# Patient Record
Sex: MALE | Race: WHITE | NOT HISPANIC OR LATINO | Employment: OTHER | URBAN - METROPOLITAN AREA
[De-identification: names, ages, dates, MRNs, and addresses within clinical notes are randomized per-mention and may not be internally consistent; named-entity substitution may affect disease eponyms.]

---

## 2017-07-24 RX ORDER — LEVETIRACETAM 500 MG/1
TABLET ORAL
Qty: 180 TABLET | Refills: 0 | Status: SHIPPED | OUTPATIENT
Start: 2017-07-24 | End: 2017-11-03 | Stop reason: SDUPTHER

## 2017-09-05 DIAGNOSIS — G40.909 SEIZURE DISORDER (HCC): ICD-10-CM

## 2017-09-05 DIAGNOSIS — D75.89 MACROCYTOSIS WITHOUT ANEMIA: ICD-10-CM

## 2017-09-05 DIAGNOSIS — R79.89 ELEVATED TSH: ICD-10-CM

## 2017-09-05 DIAGNOSIS — G47.33 OSA (OBSTRUCTIVE SLEEP APNEA): ICD-10-CM

## 2017-09-05 DIAGNOSIS — Z00.00 HEALTH CARE MAINTENANCE: Primary | ICD-10-CM

## 2017-09-05 DIAGNOSIS — K52.839 MICROSCOPIC COLITIS, UNSPECIFIED MICROSCOPIC COLITIS TYPE: ICD-10-CM

## 2017-09-05 DIAGNOSIS — Z12.5 SCREENING FOR PROSTATE CANCER: ICD-10-CM

## 2017-09-07 LAB
ALBUMIN SERPL-MCNC: 4.3 G/DL (ref 3.5–5.2)
ALBUMIN/GLOB SERPL: 1.7 G/DL
ALP SERPL-CCNC: 61 U/L (ref 39–117)
ALT SERPL-CCNC: 8 U/L (ref 1–41)
APPEARANCE UR: CLEAR
AST SERPL-CCNC: 18 U/L (ref 1–40)
BACTERIA #/AREA URNS HPF: NORMAL /HPF
BASOPHILS # BLD AUTO: 0.02 10*3/MM3 (ref 0–0.2)
BASOPHILS NFR BLD AUTO: 0.4 % (ref 0–1.5)
BILIRUB SERPL-MCNC: 0.6 MG/DL (ref 0.1–1.2)
BILIRUB UR QL STRIP: NEGATIVE
BUN SERPL-MCNC: 12 MG/DL (ref 8–23)
BUN/CREAT SERPL: 12.8 (ref 7–25)
CALCIUM SERPL-MCNC: 9.8 MG/DL (ref 8.6–10.5)
CHLORIDE SERPL-SCNC: 95 MMOL/L (ref 98–107)
CO2 SERPL-SCNC: 28.3 MMOL/L (ref 22–29)
COLOR UR: YELLOW
CREAT SERPL-MCNC: 0.94 MG/DL (ref 0.76–1.27)
EOSINOPHIL # BLD AUTO: 0.1 10*3/MM3 (ref 0–0.7)
EOSINOPHIL NFR BLD AUTO: 2.2 % (ref 0.3–6.2)
EPI CELLS #/AREA URNS HPF: NORMAL /HPF
ERYTHROCYTE [DISTWIDTH] IN BLOOD BY AUTOMATED COUNT: 12.5 % (ref 11.5–14.5)
GLOBULIN SER CALC-MCNC: 2.5 GM/DL
GLUCOSE SERPL-MCNC: 83 MG/DL (ref 65–99)
GLUCOSE UR QL: NEGATIVE
HCT VFR BLD AUTO: 39.5 % (ref 40.4–52.2)
HGB BLD-MCNC: 13.9 G/DL (ref 13.7–17.6)
HGB UR QL STRIP: NEGATIVE
IMM GRANULOCYTES # BLD: 0 10*3/MM3 (ref 0–0.03)
IMM GRANULOCYTES NFR BLD: 0 % (ref 0–0.5)
KETONES UR QL STRIP: NEGATIVE
LEUKOCYTE ESTERASE UR QL STRIP: NEGATIVE
LYMPHOCYTES # BLD AUTO: 1.55 10*3/MM3 (ref 0.9–4.8)
LYMPHOCYTES NFR BLD AUTO: 34.8 % (ref 19.6–45.3)
MCH RBC QN AUTO: 35 PG (ref 27–32.7)
MCHC RBC AUTO-ENTMCNC: 35.2 G/DL (ref 32.6–36.4)
MCV RBC AUTO: 99.5 FL (ref 79.8–96.2)
MICRO URNS: NORMAL
MICRO URNS: NORMAL
MONOCYTES # BLD AUTO: 0.55 10*3/MM3 (ref 0.2–1.2)
MONOCYTES NFR BLD AUTO: 12.4 % (ref 5–12)
NEUTROPHILS # BLD AUTO: 2.23 10*3/MM3 (ref 1.9–8.1)
NEUTROPHILS NFR BLD AUTO: 50.2 % (ref 42.7–76)
NITRITE UR QL STRIP: NEGATIVE
PH UR STRIP: 7 [PH] (ref 5–7.5)
PLATELET # BLD AUTO: 177 10*3/MM3 (ref 140–500)
POTASSIUM SERPL-SCNC: 4.4 MMOL/L (ref 3.5–5.2)
PROT SERPL-MCNC: 6.8 G/DL (ref 6–8.5)
PROT UR QL STRIP: NEGATIVE
PSA SERPL-MCNC: 6.35 NG/ML (ref 0–4)
RBC # BLD AUTO: 3.97 10*6/MM3 (ref 4.6–6)
RBC #/AREA URNS HPF: NORMAL /HPF
SODIUM SERPL-SCNC: 134 MMOL/L (ref 136–145)
SP GR UR: 1.01 (ref 1–1.03)
TSH SERPL DL<=0.005 MIU/L-ACNC: 3.51 MIU/ML (ref 0.27–4.2)
URINALYSIS REFLEX: NORMAL
UROBILINOGEN UR STRIP-MCNC: 0.2 MG/DL (ref 0.2–1)
WBC # BLD AUTO: 4.45 10*3/MM3 (ref 4.5–10.7)
WBC #/AREA URNS HPF: NORMAL /HPF

## 2017-09-13 ENCOUNTER — OFFICE VISIT (OUTPATIENT)
Dept: INTERNAL MEDICINE | Facility: CLINIC | Age: 67
End: 2017-09-13

## 2017-09-13 VITALS
HEIGHT: 76 IN | SYSTOLIC BLOOD PRESSURE: 110 MMHG | OXYGEN SATURATION: 98 % | HEART RATE: 73 BPM | BODY MASS INDEX: 25.33 KG/M2 | RESPIRATION RATE: 18 BRPM | DIASTOLIC BLOOD PRESSURE: 68 MMHG | WEIGHT: 208 LBS

## 2017-09-13 DIAGNOSIS — D75.89 MACROCYTOSIS WITHOUT ANEMIA: ICD-10-CM

## 2017-09-13 DIAGNOSIS — Z00.00 HEALTH MAINTENANCE EXAMINATION: Primary | ICD-10-CM

## 2017-09-13 DIAGNOSIS — E87.1 HYPONATREMIA: ICD-10-CM

## 2017-09-13 DIAGNOSIS — Z23 NEED FOR PNEUMOCOCCAL VACCINATION: ICD-10-CM

## 2017-09-13 DIAGNOSIS — Z00.00 MEDICARE ANNUAL WELLNESS VISIT, INITIAL: ICD-10-CM

## 2017-09-13 PROCEDURE — 99397 PER PM REEVAL EST PAT 65+ YR: CPT | Performed by: INTERNAL MEDICINE

## 2017-09-13 PROCEDURE — 90732 PPSV23 VACC 2 YRS+ SUBQ/IM: CPT | Performed by: INTERNAL MEDICINE

## 2017-09-13 PROCEDURE — G0009 ADMIN PNEUMOCOCCAL VACCINE: HCPCS | Performed by: INTERNAL MEDICINE

## 2017-09-13 PROCEDURE — G0438 PPPS, INITIAL VISIT: HCPCS | Performed by: INTERNAL MEDICINE

## 2017-09-13 RX ORDER — ASPIRIN 81 MG/1
81 TABLET ORAL DAILY
COMMUNITY
End: 2019-10-28

## 2017-09-13 NOTE — PROGRESS NOTES
Medicare Annual Wellness Visit    Chief Complaint:  Annual Exam (Initial AWV & CPE)      History of Present Illness    Kadeem Reyes is a 67 y.o. male who presents for an Annual Wellness Visit & CPE. In addition, we addressed the following health issues:    C-scope: 2012 Dr. Sweet   Flu shot: not yet  Pneumovax: due  Dental Exam: within the past year  Eye Exam: within the past year  Exercise:  Goes to [facility]  Advanced Care Planning:  has NO advance directive - information provided to the patient today   Immunization History   Administered Date(s) Administered   • Influenza, Quadrivalent 09/29/2013, 08/31/2015   • Pneumococcal Conjugate 13-Valent 09/08/2016   • Pneumococcal Polysaccharide 09/13/2017   • Tdap 01/01/2011   • influenza Split 09/09/2016     He just saw urology for his prostate exam and had labs there.  (Dr. Lincoln Rinaldi).  He was told everything looks fine.      It has been a couple of years since he has seen Dr. Richmond for his colitis.  No blood in his stools.  The budesonide keeps everything controlled.  We discussed that his hgb decreased one point.  He has not noticed any blood in his stools.  He does bruise and bleed easily so he has cut his ASA back to three days a week.      Review of Systems   Constitution: Negative for chills, fever and malaise/fatigue.   HENT: Negative for headaches, hearing loss, hoarse voice and sore throat.    Eyes: Negative for blurred vision, double vision and visual disturbance.   Cardiovascular: Negative for chest pain, leg swelling and palpitations.   Respiratory: Negative for cough and shortness of breath.    Endocrine: Negative for polydipsia and polyuria.   Hematologic/Lymphatic: Bruises/bleeds easily.   Skin: Negative for rash and suspicious lesions.   Musculoskeletal: Negative for arthritis and myalgias.   Gastrointestinal: Negative for abdominal pain, change in bowel habit, constipation, diarrhea, dysphagia, hematochezia, melena, nausea and vomiting.    Genitourinary: Negative for dysuria and hematuria.   Neurological: Negative for dizziness and light-headedness.   Psychiatric/Behavioral: Negative for depression. The patient is not nervous/anxious.        Past Medical History:   Diagnosis Date   • Gait instability    • History of gout    • Hyponatremia    • Intracranial hemorrhage    • Osteoarthritis    • Peripheral neuropathy    • Seizure disorder    • Seizures    • Sleep apnea    • Traumatic brain injury        Past Surgical History:   Procedure Laterality Date   • ABDOMINAL SURGERY     • EYE SURGERY         Social History     Social History   • Marital status:      Spouse name: N/A   • Number of children: 2   • Years of education: N/A     Occupational History   • retired teacher      Social History Main Topics   • Smoking status: Never Smoker   • Smokeless tobacco: Never Used   • Alcohol use Yes      Comment: daily 3-4 beers   • Drug use: No   • Sexual activity: Not on file     Other Topics Concern   • Not on file     Social History Narrative       Family History   Problem Relation Age of Onset   • Kidney cancer Mother    • Diabetes Father    • Diabetes Sister    • Leukemia Sister        Allergies   Allergen Reactions   • Phenytoin        Current Outpatient Prescriptions on File Prior to Visit   Medication Sig Dispense Refill   • BUDESONIDE ER PO Take  by mouth daily.     • calcium carbonate-vitamin d (CALTRATE 600+D) 600-400 MG-UNIT per tablet Take  by mouth.     • Cholecalciferol (VITAMIN D) 1000 UNITS tablet Take  by mouth.     • fluocinonide (LIDEX) 0.05 % external solution APPLY TO SCALP D  8   • levETIRAcetam (KEPPRA) 500 MG tablet TAKE 1 TABLET BY MOUTH TWICE DAILY 180 tablet 0   • vitamin E 600 UNIT capsule Take  by mouth.     • [DISCONTINUED] silver sulfadiazine (SILVADENE, SSD) 1 % cream Apply  topically 2 (Two) Times a Day. 50 g 0     No current facility-administered medications on file prior to visit.        Patient Active Problem List  "  Diagnosis   • Gait instability   • Hyponatremia   • Microscopic colitis   • Disorder of peripheral nervous system   • Seizure disorder   • DANIEL (obstructive sleep apnea)   • Elevated TSH   • Macrocytosis without anemia   • Rosacea       Health Risk Assessment/Depression Screen/Functional and Cognitive Screening:   The patient has completed a Health Risk Assessment & Depression screen. These have been reviewed with them and have been scanned as a separate documents.    Age-appropriate Screening Schedule:  Refer to the list below for future screening recommendations based on patient's age. Orders for these recommended tests are listed in the plan section. The patient has been provided with a written plan.     I have reviewed their problem list, past medical history, family history, social history, and surgical history.     Vitals:    09/13/17 0937   BP: 110/68   Pulse: 73   Resp: 18   SpO2: 98%   Weight: 208 lb (94.3 kg)   Height: 76\" (193 cm)   PainSc: 0-No pain       Body mass index is 25.32 kg/(m^2).    Physical Exam   Constitutional: He is oriented to person, place, and time. He appears well-developed and well-nourished. No distress.   HENT:   Head: Normocephalic and atraumatic.   Right Ear: Hearing and external ear normal.   Left Ear: Hearing and external ear normal.   Nose: Nose normal.   Mouth/Throat: Oropharynx is clear and moist and mucous membranes are normal.   Eyes: Conjunctivae, EOM and lids are normal. Pupils are equal, round, and reactive to light. No scleral icterus.   Neck: Trachea normal and normal range of motion. Neck supple. Carotid bruit is not present.   Cardiovascular: Normal rate, regular rhythm and normal heart sounds.  Exam reveals no gallop and no friction rub.    No murmur heard.  Pulses:       Carotid pulses are 2+ on the right side, and 2+ on the left side.       Femoral pulses are 2+ on the right side, and 2+ on the left side.       Dorsalis pedis pulses are 2+ on the right side, and " 2+ on the left side.        Posterior tibial pulses are 2+ on the right side, and 2+ on the left side.   Pulmonary/Chest: Effort normal and breath sounds normal. No respiratory distress. He has no wheezes. He has no rales.   Abdominal: Soft. Normal appearance and bowel sounds are normal. He exhibits no distension and no mass. There is no tenderness.   Musculoskeletal: Normal range of motion. He exhibits no edema.       Vascular Status -  His exam exhibits no right foot edema. His exam exhibits no left foot edema.   Skin Integrity  -  His right foot skin is intact.     Kadeem 's left foot skin is intact. .  Lymphadenopathy:     He has no cervical adenopathy.        Right: No inguinal and no supraclavicular adenopathy present.        Left: No inguinal and no supraclavicular adenopathy present.   Neurological: He is alert and oriented to person, place, and time. He has normal strength. No cranial nerve deficit. He exhibits normal muscle tone. Coordination and gait normal.   Skin: Skin is warm and dry. No rash noted.   Psychiatric: He has a normal mood and affect. His speech is normal and behavior is normal. Judgment and thought content normal. Cognition and memory are normal.   Vitals reviewed.      Results for orders placed or performed in visit on 09/05/17   Comprehensive Metabolic Panel   Result Value Ref Range    Glucose 83 65 - 99 mg/dL    BUN 12 8 - 23 mg/dL    Creatinine 0.94 0.76 - 1.27 mg/dL    eGFR Non African Am 81 >60 mL/min/1.73    eGFR African Am 98 >60 mL/min/1.73    BUN/Creatinine Ratio 12.8 7.0 - 25.0    Sodium 134 (L) 136 - 145 mmol/L    Potassium 4.4 3.5 - 5.2 mmol/L    Chloride 95 (L) 98 - 107 mmol/L    Total CO2 28.3 22.0 - 29.0 mmol/L    Calcium 9.8 8.6 - 10.5 mg/dL    Total Protein 6.8 6.0 - 8.5 g/dL    Albumin 4.30 3.50 - 5.20 g/dL    Globulin 2.5 gm/dL    A/G Ratio 1.7 g/dL    Total Bilirubin 0.6 0.1 - 1.2 mg/dL    Alkaline Phosphatase 61 39 - 117 U/L    AST (SGOT) 18 1 - 40 U/L    ALT (SGPT) 8 1  - 41 U/L   TSH Rfx On Abnormal To Free T4   Result Value Ref Range    TSH 3.51 0.27 - 4.2 mIU/mL   UA / M With / Rflx Culture([facility] ONLY)   Result Value Ref Range    Specific Gravity, UA 1.008 1.005 - 1.030    pH, UA 7.0 5.0 - 7.5    Color, UA Yellow Yellow    Appearance, UA Clear Clear    Leukocytes, UA Negative Negative    Protein Negative Negative/Trace    Glucose, UA Negative Negative    Ketones Negative Negative    Blood, UA Negative Negative    Bilirubin, UA Negative Negative    Urobilinogen, UA 0.2 0.2 - 1.0 mg/dL    Nitrite, UA Negative Negative    Microscopic Examination Comment     MICROSCOPIC EXAMINATION See below:     Urinalysis Reflex Comment    PSA   Result Value Ref Range    PSA 6.350 (H) 0.000 - 4.000 ng/mL   Microscopic Examination   Result Value Ref Range    WBC, UA None seen 0 - 5 /hpf    RBC, UA None seen 0 - 2 /hpf    Epithelial Cells (non renal) None seen 0 - 10 /hpf    Bacteria, UA None seen None seen/Few /hpf   CBC & Differential   Result Value Ref Range    WBC 4.45 (L) 4.50 - 10.70 10*3/mm3    RBC 3.97 (L) 4.60 - 6.00 10*6/mm3    Hemoglobin 13.9 13.7 - 17.6 g/dL    Hematocrit 39.5 (L) 40.4 - 52.2 %    MCV 99.5 (H) 79.8 - 96.2 fL    MCH 35.0 (H) 27.0 - 32.7 pg    MCHC 35.2 32.6 - 36.4 g/dL    RDW 12.5 11.5 - 14.5 %    Platelets 177 140 - 500 10*3/mm3    Neutrophil Rel % 50.2 42.7 - 76.0 %    Lymphocyte Rel % 34.8 19.6 - 45.3 %    Monocyte Rel % 12.4 (H) 5.0 - 12.0 %    Eosinophil Rel % 2.2 0.3 - 6.2 %    Basophil Rel % 0.4 0.0 - 1.5 %    Neutrophils Absolute 2.23 1.90 - 8.10 10*3/mm3    Lymphocytes Absolute 1.55 0.90 - 4.80 10*3/mm3    Monocytes Absolute 0.55 0.20 - 1.20 10*3/mm3    Eosinophils Absolute 0.10 0.00 - 0.70 10*3/mm3    Basophils Absolute 0.02 0.00 - 0.20 10*3/mm3    Immature Granulocyte Rel % 0.0 0.0 - 0.5 %    Immature Grans Absolute 0.00 0.00 - 0.03 10*3/mm3       Assessment & Plan:    Diagnoses and all orders for this visit:    Health maintenance examination    Medicare  annual wellness visit, initial    Need for pneumococcal vaccination  -     Pneumococcal Polysaccharide Vaccine 23-Valent Greater Than or Equal To 1yo Subcutaneous / IM    Hyponatremia    Macrocytosis without anemia  -     CBC & Differential; Future    Other orders  -     aspirin 81 MG EC tablet; Take 81 mg by mouth Daily.        Discussion/Summary  Kadeem is here for routine CPE and initial AWV.  He is doing well.  Will get pneumovax today.  Encouraged to look into the shingles vaccine at [facility].  He will get a flu shot at an outside facility.  Continue regular exercise.  We will repeat a CBC in one month to make sure his hgb is not continuing to decrease.   He has a chronic mild hyponatremia that is very stable.  Will continue to monitor yearly.      Follow Up:  Return in about 1 month (around 10/13/2017) for labs only; 1 year CPE with labs prior.     An After Visit Summary and PPPS with all of these plans were given to the patient.

## 2017-09-13 NOTE — PATIENT INSTRUCTIONS
Medicare Wellness  Personal Prevention Plan of Service     Date of Office Visit:  2017  Encounter Provider:  Jacquelyn Crowe MD  Place of Service:  [facility] INTERNAL MEDICINE  Patient Name: Kadeem Reyes  :  1950    As part of the Medicare Wellness portion of your visit today, we are providing you with this personalized preventive plan of services (PPPS). This plan is based upon recommendations of the [place] Preventive Services Task Force (USPSTF) and the Advisory Committee on Immunization Practices (ACIP).    This lists the preventive care services that should be considered, and provides dates of when you are due. Items listed as completed are up-to-date and do not require any further intervention.    Health Maintenance   Topic Date Due   • MEDICARE ANNUAL WELLNESS  2016   • ZOSTER VACCINE  2016   • INFLUENZA VACCINE  2017   • PNEUMOCOCCAL VACCINES (65+ LOW/MEDIUM RISK) (2 of 2 - PPSV23) 2017   • TDAP/TD VACCINES (2 - Td) 2021   • COLONOSCOPY  2022   • HEPATITIS C SCREENING  Completed       Orders Placed This Encounter   Procedures   • Pneumococcal Polysaccharide Vaccine 23-Valent Greater Than or Equal To 3yo Subcutaneous / IM       Return in about 1 month (around 10/13/2017) for labs only; 1 year CPE with labs prior.

## 2017-10-13 DIAGNOSIS — D75.89 MACROCYTOSIS WITHOUT ANEMIA: ICD-10-CM

## 2017-10-13 LAB
BASOPHILS # BLD AUTO: 0.01 10*3/MM3 (ref 0–0.2)
BASOPHILS NFR BLD AUTO: 0.2 % (ref 0–1.5)
EOSINOPHIL # BLD AUTO: 0.07 10*3/MM3 (ref 0–0.7)
EOSINOPHIL NFR BLD AUTO: 1.7 % (ref 0.3–6.2)
ERYTHROCYTE [DISTWIDTH] IN BLOOD BY AUTOMATED COUNT: 13 % (ref 11.5–14.5)
HCT VFR BLD AUTO: 41 % (ref 40.4–52.2)
HGB BLD-MCNC: 14.3 G/DL (ref 13.7–17.6)
IMM GRANULOCYTES # BLD: 0 10*3/MM3 (ref 0–0.03)
IMM GRANULOCYTES NFR BLD: 0 % (ref 0–0.5)
LYMPHOCYTES # BLD AUTO: 1.57 10*3/MM3 (ref 0.9–4.8)
LYMPHOCYTES NFR BLD AUTO: 37.3 % (ref 19.6–45.3)
MCH RBC QN AUTO: 35 PG (ref 27–32.7)
MCHC RBC AUTO-ENTMCNC: 34.9 G/DL (ref 32.6–36.4)
MCV RBC AUTO: 100.2 FL (ref 79.8–96.2)
MONOCYTES # BLD AUTO: 0.54 10*3/MM3 (ref 0.2–1.2)
MONOCYTES NFR BLD AUTO: 12.8 % (ref 5–12)
NEUTROPHILS # BLD AUTO: 2.02 10*3/MM3 (ref 1.9–8.1)
NEUTROPHILS NFR BLD AUTO: 48 % (ref 42.7–76)
PLATELET # BLD AUTO: 150 10*3/MM3 (ref 140–500)
RBC # BLD AUTO: 4.09 10*6/MM3 (ref 4.6–6)
WBC # BLD AUTO: 4.21 10*3/MM3 (ref 4.5–10.7)

## 2017-10-16 ENCOUNTER — TELEPHONE (OUTPATIENT)
Dept: INTERNAL MEDICINE | Facility: CLINIC | Age: 67
End: 2017-10-16

## 2017-10-16 NOTE — TELEPHONE ENCOUNTER
----- Message from Jacquelyn Crowe MD sent at 10/16/2017  8:10 AM EDT -----  Please call - his hgb has gone back up to where it normally has been.

## 2017-10-24 ENCOUNTER — OFFICE VISIT (OUTPATIENT)
Dept: GASTROENTEROLOGY | Facility: CLINIC | Age: 67
End: 2017-10-24

## 2017-10-24 VITALS
TEMPERATURE: 98.1 F | HEIGHT: 76 IN | BODY MASS INDEX: 24.87 KG/M2 | DIASTOLIC BLOOD PRESSURE: 68 MMHG | SYSTOLIC BLOOD PRESSURE: 122 MMHG | WEIGHT: 204.2 LBS

## 2017-10-24 DIAGNOSIS — K52.9 COLITIS: Primary | ICD-10-CM

## 2017-10-24 PROCEDURE — 99214 OFFICE O/P EST MOD 30 MIN: CPT | Performed by: INTERNAL MEDICINE

## 2017-10-24 RX ORDER — BUDESONIDE 3 MG/1
3 CAPSULE, COATED PELLETS ORAL DAILY PRN
Qty: 30 CAPSULE | Refills: 5 | Status: SHIPPED | OUTPATIENT
Start: 2017-10-24 | End: 2018-08-27 | Stop reason: SDUPTHER

## 2017-10-24 NOTE — PROGRESS NOTES
Chief Complaint   Patient presents with   • Follow-up     microscopic colitis        Kadeem Reyes is a  67 y.o. male here for a follow up visit for Microscopic colitis    HPI This 67-year-old white male patient of Dr. Jacquelyn Crowe returns in follow-up since his last visit April 2015.  He has a history of microscopic colitis and has been treated in the past with budesonide that affords good symptomatic relief.  He currently has intermittent symptoms requiring him to take 3 mg dose every 2 weeks or so.  I advised he could continue this regimen for the present.  We did talk about possible IBS issues, he is going to try a fiber supplement to see if this affords relief of his loose stool pattern.  He denies any significant stress or pain associated with his bowel movements.  His weight has been stable.  Last colonoscopy was performed in March 2012, he has no family history of colon cancer or polyps.  He would be due for follow-up examination in 2022.  I've advised to follow-up annually and call sooner as needed.    Past Medical History:   Diagnosis Date   • Gait instability    • History of gout    • Hyponatremia    • Intracranial hemorrhage    • Osteoarthritis    • Peripheral neuropathy    • Seizure disorder    • Seizures    • Sleep apnea    • Traumatic brain injury        Current Outpatient Prescriptions   Medication Sig Dispense Refill   • aspirin 81 MG EC tablet Take 81 mg by mouth Daily.     • calcium carbonate-vitamin d (CALTRATE 600+D) 600-400 MG-UNIT per tablet Take  by mouth.     • Cholecalciferol (VITAMIN D) 1000 UNITS tablet Take  by mouth.     • fluocinonide (LIDEX) 0.05 % external solution APPLY TO SCALP D  8   • levETIRAcetam (KEPPRA) 500 MG tablet TAKE 1 TABLET BY MOUTH TWICE DAILY 180 tablet 0   • vitamin E 600 UNIT capsule Take  by mouth.     • BUDESONIDE ER PO Take  by mouth daily.       No current facility-administered medications for this visit.        PRN Meds:.    Allergies   Allergen Reactions   •  Phenytoin        Social History     Social History   • Marital status:      Spouse name: N/A   • Number of children: 2   • Years of education: N/A     Occupational History   • retired teacher      Social History Main Topics   • Smoking status: Never Smoker   • Smokeless tobacco: Never Used   • Alcohol use Yes      Comment: daily 3-4 beers   • Drug use: No   • Sexual activity: Not on file     Other Topics Concern   • Not on file     Social History Narrative       Family History   Problem Relation Age of Onset   • Kidney cancer Mother    • Diabetes Father    • Diabetes Sister    • Leukemia Sister        Review of Systems   Constitutional: Negative for activity change, appetite change, fatigue and unexpected weight change.   HENT: Negative for congestion, facial swelling, sore throat, trouble swallowing and voice change.    Eyes: Negative for photophobia and visual disturbance.   Respiratory: Negative for cough and choking.    Cardiovascular: Negative for chest pain.   Gastrointestinal: Positive for diarrhea. Negative for abdominal distention, abdominal pain, anal bleeding, blood in stool, constipation, nausea, rectal pain and vomiting.   Endocrine: Negative for polyphagia.   Musculoskeletal: Negative for arthralgias, gait problem and joint swelling.   Skin: Negative for color change, pallor and rash.   Allergic/Immunologic: Negative for food allergies.   Neurological: Negative for speech difficulty and headaches.   Hematological: Does not bruise/bleed easily.   Psychiatric/Behavioral: Negative for agitation, confusion and sleep disturbance.       Vitals:    10/24/17 0842   BP: 122/68   Temp: 98.1 °F (36.7 °C)       Physical Exam   Constitutional: He is oriented to person, place, and time. He appears well-developed and well-nourished.   HENT:   Head: Normocephalic.   Mouth/Throat: Oropharynx is clear and moist.   Eyes: Conjunctivae and EOM are normal.   Neck: Normal range of motion.   Cardiovascular: Normal rate  and regular rhythm.    Pulmonary/Chest: Breath sounds normal.   Abdominal: Soft. Bowel sounds are normal.   Musculoskeletal: Normal range of motion.   Neurological: He is alert and oriented to person, place, and time.   Skin: Skin is warm and dry.   Psychiatric: He has a normal mood and affect. His behavior is normal.       ASSESSMENT   #1 microscopic colitis: Relatively stable, uses budesonide on occasion.      PLAN  Prescription for budesonide  Patient to try a fiber supplement  Follow-up in 1 year, call sooner as needed      ICD-10-CM ICD-9-CM   1. Colitis K52.9 558.9

## 2017-11-03 RX ORDER — LEVETIRACETAM 500 MG/1
TABLET ORAL
Qty: 180 TABLET | Refills: 0 | Status: SHIPPED | OUTPATIENT
Start: 2017-11-03 | End: 2018-06-18

## 2017-11-03 NOTE — TELEPHONE ENCOUNTER
Pt is out of his Kepra, Sebastien agreed to refill for pt, since his doctor had left the practice. Script sent in today.

## 2018-05-15 ENCOUNTER — OFFICE VISIT (OUTPATIENT)
Dept: INTERNAL MEDICINE | Facility: CLINIC | Age: 68
End: 2018-05-15

## 2018-05-15 VITALS
HEIGHT: 76 IN | OXYGEN SATURATION: 98 % | BODY MASS INDEX: 26.3 KG/M2 | DIASTOLIC BLOOD PRESSURE: 74 MMHG | WEIGHT: 216 LBS | RESPIRATION RATE: 18 BRPM | SYSTOLIC BLOOD PRESSURE: 130 MMHG | HEART RATE: 73 BPM

## 2018-05-15 DIAGNOSIS — R79.89 ELEVATED TSH: ICD-10-CM

## 2018-05-15 DIAGNOSIS — M25.472 ANKLE EDEMA, BILATERAL: Primary | ICD-10-CM

## 2018-05-15 DIAGNOSIS — M25.471 ANKLE EDEMA, BILATERAL: Primary | ICD-10-CM

## 2018-05-15 DIAGNOSIS — E87.1 HYPONATREMIA: ICD-10-CM

## 2018-05-15 LAB
ALBUMIN SERPL-MCNC: 4.4 G/DL (ref 3.5–5.2)
ALBUMIN/GLOB SERPL: 1.8 G/DL
ALP SERPL-CCNC: 65 U/L (ref 39–117)
ALT SERPL-CCNC: 12 U/L (ref 1–41)
AST SERPL-CCNC: 17 U/L (ref 1–40)
BASOPHILS # BLD AUTO: 0.01 10*3/MM3 (ref 0–0.2)
BASOPHILS NFR BLD AUTO: 0.2 % (ref 0–1.5)
BILIRUB SERPL-MCNC: 0.8 MG/DL (ref 0.1–1.2)
BUN SERPL-MCNC: 11 MG/DL (ref 8–23)
BUN/CREAT SERPL: 12.4 (ref 7–25)
CALCIUM SERPL-MCNC: 9.2 MG/DL (ref 8.6–10.5)
CHLORIDE SERPL-SCNC: 90 MMOL/L (ref 98–107)
CO2 SERPL-SCNC: 26.1 MMOL/L (ref 22–29)
CREAT SERPL-MCNC: 0.89 MG/DL (ref 0.76–1.27)
EOSINOPHIL # BLD AUTO: 0.07 10*3/MM3 (ref 0–0.7)
EOSINOPHIL NFR BLD AUTO: 1.7 % (ref 0.3–6.2)
ERYTHROCYTE [DISTWIDTH] IN BLOOD BY AUTOMATED COUNT: 12.3 % (ref 11.5–14.5)
GFR SERPLBLD CREATININE-BSD FMLA CKD-EPI: 104 ML/MIN/1.73
GFR SERPLBLD CREATININE-BSD FMLA CKD-EPI: 86 ML/MIN/1.73
GLOBULIN SER CALC-MCNC: 2.4 GM/DL
GLUCOSE SERPL-MCNC: 90 MG/DL (ref 65–99)
HCT VFR BLD AUTO: 39 % (ref 40.4–52.2)
HGB BLD-MCNC: 14.2 G/DL (ref 13.7–17.6)
IMM GRANULOCYTES # BLD: 0 10*3/MM3 (ref 0–0.03)
IMM GRANULOCYTES NFR BLD: 0 % (ref 0–0.5)
LYMPHOCYTES # BLD AUTO: 0.96 10*3/MM3 (ref 0.9–4.8)
LYMPHOCYTES NFR BLD AUTO: 23.4 % (ref 19.6–45.3)
MCH RBC QN AUTO: 35.6 PG (ref 27–32.7)
MCHC RBC AUTO-ENTMCNC: 36.4 G/DL (ref 32.6–36.4)
MCV RBC AUTO: 97.7 FL (ref 79.8–96.2)
MONOCYTES # BLD AUTO: 0.6 10*3/MM3 (ref 0.2–1.2)
MONOCYTES NFR BLD AUTO: 14.6 % (ref 5–12)
NEUTROPHILS # BLD AUTO: 2.47 10*3/MM3 (ref 1.9–8.1)
NEUTROPHILS NFR BLD AUTO: 60.1 % (ref 42.7–76)
PLATELET # BLD AUTO: 176 10*3/MM3 (ref 140–500)
POTASSIUM SERPL-SCNC: 5 MMOL/L (ref 3.5–5.2)
PROT SERPL-MCNC: 6.8 G/DL (ref 6–8.5)
RBC # BLD AUTO: 3.99 10*6/MM3 (ref 4.6–6)
SODIUM SERPL-SCNC: 128 MMOL/L (ref 136–145)
TSH SERPL DL<=0.005 MIU/L-ACNC: 3.31 MIU/ML (ref 0.27–4.2)
WBC # BLD AUTO: 4.11 10*3/MM3 (ref 4.5–10.7)

## 2018-05-15 PROCEDURE — 99214 OFFICE O/P EST MOD 30 MIN: CPT | Performed by: INTERNAL MEDICINE

## 2018-05-15 PROCEDURE — 93000 ELECTROCARDIOGRAM COMPLETE: CPT | Performed by: INTERNAL MEDICINE

## 2018-05-15 NOTE — PROGRESS NOTES
Chief Complaint  Kadeem Reyes is a 67 y.o. male who presents for Joint Swelling (Bilateral ankle swelling, left swells more. x5days, (fine in the morning, happens in the evening) walking is difficult, denies redness and pain. Stiff feeling. )  .    History of Present Illness   Kadeem is here for acute care for a new issue.  He has had bilateral ankle swelling for 5 days.  No pain in his ankles, just stiffness.  No erythema.  No cp or palp or dizziness. He hasn't travelled anywhere lately.  No long car or plane rides.  No new meds.  He feels fine otherwise.  He has been using a fair amount of sea salt on his tomatoes.  That's the only new thing he has been doing.      Review of Systems   Constitution: Positive for weight gain.   Cardiovascular: Positive for leg swelling. Negative for chest pain, claudication, dyspnea on exertion and palpitations.   Respiratory: Negative for cough and shortness of breath.    Skin: Negative for color change and rash.   Musculoskeletal: Positive for stiffness. Negative for gout and muscle cramps.       Patient Active Problem List   Diagnosis   • Gait instability   • Hyponatremia   • Microscopic colitis   • Disorder of peripheral nervous system   • Seizure disorder   • DANIEL (obstructive sleep apnea)   • Elevated TSH   • Macrocytosis without anemia   • Rosacea       Past Medical History:   Diagnosis Date   • Gait instability    • History of gout    • Hyponatremia    • Intracranial hemorrhage    • Osteoarthritis    • Peripheral neuropathy    • Seizure disorder    • Seizures    • Sleep apnea    • Traumatic brain injury        Past Surgical History:   Procedure Laterality Date   • ABDOMINAL SURGERY     • COLONOSCOPY  03/27/2012    normal   • EYE SURGERY         Family History   Problem Relation Age of Onset   • Kidney cancer Mother    • Diabetes Father    • Diabetes Sister    • Leukemia Sister        Social History     Social History   • Marital status:      Spouse name: N/A   • Number of  "children: 2   • Years of education: N/A     Occupational History   • retired teacher      Social History Main Topics   • Smoking status: Never Smoker   • Smokeless tobacco: Never Used   • Alcohol use Yes      Comment: daily 3-4 beers   • Drug use: No   • Sexual activity: Not on file     Other Topics Concern   • Not on file     Social History Narrative   • No narrative on file       Current Outpatient Prescriptions on File Prior to Visit   Medication Sig Dispense Refill   • aspirin 81 MG EC tablet Take 81 mg by mouth Daily.     • Budesonide (ENTOCORT EC) 3 MG 24 hr capsule Take 1 capsule by mouth Daily As Needed (Loose stools). 30 capsule 5   • calcium carbonate-vitamin d (CALTRATE 600+D) 600-400 MG-UNIT per tablet Take  by mouth.     • Cholecalciferol (VITAMIN D) 1000 UNITS tablet Take  by mouth.     • fluocinonide (LIDEX) 0.05 % external solution APPLY TO SCALP D  8   • levETIRAcetam (KEPPRA) 500 MG tablet Take 1 tablet by mouth twice daily. 180 tablet 0   • vitamin E 600 UNIT capsule Take  by mouth.       No current facility-administered medications on file prior to visit.        Allergies   Allergen Reactions   • Phenytoin        Vitals:    05/15/18 0845   BP: 130/74   Pulse: 73   Resp: 18   SpO2: 98%   Weight: 98 kg (216 lb)   Height: 193 cm (75.98\")       Body mass index is 26.3 kg/m².    Objective   Physical Exam   Constitutional: He is oriented to person, place, and time. He appears well-developed and well-nourished. No distress.   HENT:   Head: Normocephalic and atraumatic.   Mouth/Throat: Oropharynx is clear and moist.   Eyes: Conjunctivae are normal. No scleral icterus.   Neck: Normal range of motion. Neck supple.   Cardiovascular: Normal rate, regular rhythm and normal heart sounds.    No murmur heard.  Pulmonary/Chest: Effort normal and breath sounds normal. No respiratory distress. He has no wheezes. He has no rales.   Musculoskeletal: He exhibits edema (2+ BLE ankle swelling).   No calf tenderness " bilaterally.  No erythema or warmth.  Neg Dorinda sign   Lymphadenopathy:     He has no cervical adenopathy.   Neurological: He is alert and oriented to person, place, and time. No cranial nerve deficit.   Psychiatric: He has a normal mood and affect. His behavior is normal. Judgment and thought content normal.       Results for orders placed or performed in visit on 10/13/17   CBC & Differential   Result Value Ref Range    WBC 4.21 (L) 4.50 - 10.70 10*3/mm3    RBC 4.09 (L) 4.60 - 6.00 10*6/mm3    Hemoglobin 14.3 13.7 - 17.6 g/dL    Hematocrit 41.0 40.4 - 52.2 %    .2 (H) 79.8 - 96.2 fL    MCH 35.0 (H) 27.0 - 32.7 pg    MCHC 34.9 32.6 - 36.4 g/dL    RDW 13.0 11.5 - 14.5 %    Platelets 150 140 - 500 10*3/mm3    Neutrophil Rel % 48.0 42.7 - 76.0 %    Lymphocyte Rel % 37.3 19.6 - 45.3 %    Monocyte Rel % 12.8 (H) 5.0 - 12.0 %    Eosinophil Rel % 1.7 0.3 - 6.2 %    Basophil Rel % 0.2 0.0 - 1.5 %    Neutrophils Absolute 2.02 1.90 - 8.10 10*3/mm3    Lymphocytes Absolute 1.57 0.90 - 4.80 10*3/mm3    Monocytes Absolute 0.54 0.20 - 1.20 10*3/mm3    Eosinophils Absolute 0.07 0.00 - 0.70 10*3/mm3    Basophils Absolute 0.01 0.00 - 0.20 10*3/mm3    Immature Granulocyte Rel % 0.0 0.0 - 0.5 %    Immature Grans Absolute 0.00 0.00 - 0.03 10*3/mm3       ECG 12 Lead  Date/Time: 5/15/2018 10:14 AM  Performed by: ERIN RODRIGUEZ  Authorized by: ERIN RODRIGUEZ   Comparison: not compared with previous ECG   Rhythm: sinus rhythm  Rate: normal  BPM: 63  Conduction: conduction normal  ST Segments: ST segments normal  QRS axis: normal  Other: no other findings  Clinical impression: normal ECG  Comments: Reason for exam:  ble edema              Assessment/Plan   Diagnoses and all orders for this visit:    Ankle edema, bilateral  -     Comprehensive Metabolic Panel  -     CBC & Differential  -     ECG 12 Lead    Hyponatremia  -     TSH  -     ECG 12 Lead    Elevated TSH  -     TSH  -     ECG 12 Lead        Discussion/Summary  Kadeem botello  here for acute care for a new issue.  He certainly has edema in his ankles which is new.  He has no risk factors for DVT.  He is not having any other cardiac symptoms.  We will check labs today.  He is going to stop salting his tomatoes.  Will determine tomorrow once his labs are back whether to get BLE dopplers and/or 2decho.    No Follow-up on file.

## 2018-05-16 DIAGNOSIS — E87.1 HYPONATREMIA: ICD-10-CM

## 2018-05-16 DIAGNOSIS — R60.0 BILATERAL LOWER EXTREMITY EDEMA: Primary | ICD-10-CM

## 2018-05-17 ENCOUNTER — HOSPITAL ENCOUNTER (OUTPATIENT)
Dept: CARDIOLOGY | Facility: HOSPITAL | Age: 68
Discharge: HOME | End: 2018-05-17
Admitting: INTERNAL MEDICINE

## 2018-05-17 ENCOUNTER — TELEPHONE (OUTPATIENT)
Dept: INTERNAL MEDICINE | Facility: CLINIC | Age: 68
End: 2018-05-17

## 2018-05-17 VITALS
SYSTOLIC BLOOD PRESSURE: 118 MMHG | HEART RATE: 78 BPM | HEIGHT: 76 IN | WEIGHT: 216 LBS | DIASTOLIC BLOOD PRESSURE: 66 MMHG | BODY MASS INDEX: 26.3 KG/M2

## 2018-05-17 DIAGNOSIS — E87.1 HYPONATREMIA: Primary | ICD-10-CM

## 2018-05-17 DIAGNOSIS — R60.0 BILATERAL LOWER EXTREMITY EDEMA: ICD-10-CM

## 2018-05-17 DIAGNOSIS — E87.1 HYPONATREMIA: ICD-10-CM

## 2018-05-17 LAB
BH CV ECHO MEAS - ACS: 2.4 CM
BH CV ECHO MEAS - AI DEC SLOPE: 79.6 CM/SEC^2
BH CV ECHO MEAS - AI MAX PG: 18 MMHG
BH CV ECHO MEAS - AI MAX VEL: 212.3 CM/SEC
BH CV ECHO MEAS - AI P1/2T: 781.5 MSEC
BH CV ECHO MEAS - AO MAX PG (FULL): 1.1 MMHG
BH CV ECHO MEAS - AO MAX PG: 3.6 MMHG
BH CV ECHO MEAS - AO MEAN PG (FULL): 0.86 MMHG
BH CV ECHO MEAS - AO MEAN PG: 2.4 MMHG
BH CV ECHO MEAS - AO ROOT AREA (BSA CORRECTED): 1.6
BH CV ECHO MEAS - AO ROOT AREA: 10.4 CM^2
BH CV ECHO MEAS - AO ROOT DIAM: 3.6 CM
BH CV ECHO MEAS - AO V2 MAX: 95 CM/SEC
BH CV ECHO MEAS - AO V2 MEAN: 73.1 CM/SEC
BH CV ECHO MEAS - AO V2 VTI: 26.8 CM
BH CV ECHO MEAS - AVA(I,A): 2.3 CM^2
BH CV ECHO MEAS - AVA(I,D): 2.3 CM^2
BH CV ECHO MEAS - AVA(V,A): 2.9 CM^2
BH CV ECHO MEAS - AVA(V,D): 2.9 CM^2
BH CV ECHO MEAS - BSA(HAYCOCK): 2.3 M^2
BH CV ECHO MEAS - BSA: 2.3 M^2
BH CV ECHO MEAS - BZI_BMI: 25.6 KILOGRAMS/M^2
BH CV ECHO MEAS - BZI_METRIC_HEIGHT: 193 CM
BH CV ECHO MEAS - BZI_METRIC_WEIGHT: 95.3 KG
BH CV ECHO MEAS - CONTRAST EF (2CH): 58.8 ML/M^2
BH CV ECHO MEAS - CONTRAST EF 4CH: 58 ML/M^2
BH CV ECHO MEAS - EDV(MOD-SP2): 85 ML
BH CV ECHO MEAS - EDV(MOD-SP4): 100 ML
BH CV ECHO MEAS - EDV(TEICH): 148.4 ML
BH CV ECHO MEAS - EF(CUBED): 73.1 %
BH CV ECHO MEAS - EF(MOD-BP): 59 %
BH CV ECHO MEAS - EF(MOD-SP2): 58.8 %
BH CV ECHO MEAS - EF(MOD-SP4): 58 %
BH CV ECHO MEAS - EF(TEICH): 64.3 %
BH CV ECHO MEAS - ESV(MOD-SP2): 35 ML
BH CV ECHO MEAS - ESV(MOD-SP4): 42 ML
BH CV ECHO MEAS - ESV(TEICH): 53 ML
BH CV ECHO MEAS - FS: 35.4 %
BH CV ECHO MEAS - IVS/LVPW: 0.97
BH CV ECHO MEAS - IVSD: 1.2 CM
BH CV ECHO MEAS - LAT PEAK E' VEL: 10 CM/SEC
BH CV ECHO MEAS - LV DIASTOLIC VOL/BSA (35-75): 44.2 ML/M^2
BH CV ECHO MEAS - LV MASS(C)D: 285.6 GRAMS
BH CV ECHO MEAS - LV MASS(C)DI: 126.3 GRAMS/M^2
BH CV ECHO MEAS - LV MAX PG: 2.5 MMHG
BH CV ECHO MEAS - LV MEAN PG: 1.6 MMHG
BH CV ECHO MEAS - LV SYSTOLIC VOL/BSA (12-30): 18.6 ML/M^2
BH CV ECHO MEAS - LV V1 MAX: 79.5 CM/SEC
BH CV ECHO MEAS - LV V1 MEAN: 58.9 CM/SEC
BH CV ECHO MEAS - LV V1 VTI: 17.8 CM
BH CV ECHO MEAS - LVIDD: 5.5 CM
BH CV ECHO MEAS - LVIDS: 3.6 CM
BH CV ECHO MEAS - LVLD AP2: 9.2 CM
BH CV ECHO MEAS - LVLD AP4: 8.3 CM
BH CV ECHO MEAS - LVLS AP2: 8.2 CM
BH CV ECHO MEAS - LVLS AP4: 7.2 CM
BH CV ECHO MEAS - LVOT AREA (M): 3.5 CM^2
BH CV ECHO MEAS - LVOT AREA: 3.5 CM^2
BH CV ECHO MEAS - LVOT DIAM: 2.1 CM
BH CV ECHO MEAS - LVPWD: 1.3 CM
BH CV ECHO MEAS - MED PEAK E' VEL: 8 CM/SEC
BH CV ECHO MEAS - MR MAX PG: 17.9 MMHG
BH CV ECHO MEAS - MR MAX VEL: 211.4 CM/SEC
BH CV ECHO MEAS - MV A DUR: 0.11 SEC
BH CV ECHO MEAS - MV A MAX VEL: 49 CM/SEC
BH CV ECHO MEAS - MV DEC SLOPE: 270.6 CM/SEC^2
BH CV ECHO MEAS - MV DEC TIME: 0.22 SEC
BH CV ECHO MEAS - MV E MAX VEL: 68.2 CM/SEC
BH CV ECHO MEAS - MV E/A: 1.4
BH CV ECHO MEAS - MV MAX PG: 2.9 MMHG
BH CV ECHO MEAS - MV MEAN PG: 1.3 MMHG
BH CV ECHO MEAS - MV P1/2T MAX VEL: 67.2 CM/SEC
BH CV ECHO MEAS - MV P1/2T: 72.7 MSEC
BH CV ECHO MEAS - MV V2 MAX: 85.3 CM/SEC
BH CV ECHO MEAS - MV V2 MEAN: 51.8 CM/SEC
BH CV ECHO MEAS - MV V2 VTI: 25.2 CM
BH CV ECHO MEAS - MVA P1/2T LCG: 3.3 CM^2
BH CV ECHO MEAS - MVA(P1/2T): 3 CM^2
BH CV ECHO MEAS - MVA(VTI): 2.5 CM^2
BH CV ECHO MEAS - PA ACC TIME: 0.15 SEC
BH CV ECHO MEAS - PA MAX PG (FULL): 3.6 MMHG
BH CV ECHO MEAS - PA MAX PG: 5.1 MMHG
BH CV ECHO MEAS - PA PR(ACCEL): 12.5 MMHG
BH CV ECHO MEAS - PA V2 MAX: 112.7 CM/SEC
BH CV ECHO MEAS - PULM A REVS DUR: 0.12 SEC
BH CV ECHO MEAS - PULM A REVS VEL: 37.4 CM/SEC
BH CV ECHO MEAS - PULM DIAS VEL: 41.1 CM/SEC
BH CV ECHO MEAS - PULM S/D: 0.73
BH CV ECHO MEAS - PULM SYS VEL: 29.8 CM/SEC
BH CV ECHO MEAS - PVA(V,A): 1.9 CM^2
BH CV ECHO MEAS - PVA(V,D): 1.9 CM^2
BH CV ECHO MEAS - QP/QS: 0.76
BH CV ECHO MEAS - RAP SYSTOLE: 3 MMHG
BH CV ECHO MEAS - RV MAX PG: 1.5 MMHG
BH CV ECHO MEAS - RV MEAN PG: 0.84 MMHG
BH CV ECHO MEAS - RV V1 MAX: 61.1 CM/SEC
BH CV ECHO MEAS - RV V1 MEAN: 41.3 CM/SEC
BH CV ECHO MEAS - RV V1 VTI: 13.5 CM
BH CV ECHO MEAS - RVOT AREA: 3.5 CM^2
BH CV ECHO MEAS - RVOT DIAM: 2.1 CM
BH CV ECHO MEAS - RVSP: 21 MMHG
BH CV ECHO MEAS - SI(AO): 123.2 ML/M^2
BH CV ECHO MEAS - SI(CUBED): 54.2 ML/M^2
BH CV ECHO MEAS - SI(LVOT): 27.5 ML/M^2
BH CV ECHO MEAS - SI(MOD-SP2): 22.1 ML/M^2
BH CV ECHO MEAS - SI(MOD-SP4): 25.6 ML/M^2
BH CV ECHO MEAS - SI(TEICH): 42.2 ML/M^2
BH CV ECHO MEAS - SUP REN AO DIAM: 1.8 CM
BH CV ECHO MEAS - SV(AO): 278.7 ML
BH CV ECHO MEAS - SV(CUBED): 122.7 ML
BH CV ECHO MEAS - SV(LVOT): 62.1 ML
BH CV ECHO MEAS - SV(MOD-SP2): 50 ML
BH CV ECHO MEAS - SV(MOD-SP4): 58 ML
BH CV ECHO MEAS - SV(RVOT): 47.2 ML
BH CV ECHO MEAS - SV(TEICH): 95.4 ML
BH CV ECHO MEAS - TAPSE (>1.6): 3.4 CM2
BH CV ECHO MEAS - TR MAX VEL: 209.4 CM/SEC
BH CV ECHO MEASUREMENTS AVERAGE E/E' RATIO: 7.58
BH CV XLRA - RV BASE: 2.7 CM
BH CV XLRA - TDI S': 14 CM/SEC
LEFT ATRIUM VOLUME INDEX: 27 ML/M2
SINUS: 4.4 CM

## 2018-05-17 PROCEDURE — 93306 TTE W/DOPPLER COMPLETE: CPT

## 2018-05-17 PROCEDURE — 93306 TTE W/DOPPLER COMPLETE: CPT | Performed by: INTERNAL MEDICINE

## 2018-05-17 NOTE — TELEPHONE ENCOUNTER
Informed Pt of results, Pt has a lab appt 5/1818 and a follow up visit 5/21/18     ----- Message from Jacquelyn Crowe MD sent at 5/17/2018  4:20 PM EDT -----  Please call - his echo does not show any heart failure which is good.  I would like to repeat labs tomorrow and have him see me on Monday.

## 2018-05-18 ENCOUNTER — RESULTS ENCOUNTER (OUTPATIENT)
Dept: INTERNAL MEDICINE | Facility: CLINIC | Age: 68
End: 2018-05-18

## 2018-05-18 DIAGNOSIS — E87.1 HYPONATREMIA: ICD-10-CM

## 2018-05-18 LAB
BUN SERPL-MCNC: 9 MG/DL (ref 8–23)
BUN/CREAT SERPL: 9.6 (ref 7–25)
CALCIUM SERPL-MCNC: 9.5 MG/DL (ref 8.6–10.5)
CHLORIDE SERPL-SCNC: 93 MMOL/L (ref 98–107)
CO2 SERPL-SCNC: 26.3 MMOL/L (ref 22–29)
CREAT SERPL-MCNC: 0.94 MG/DL (ref 0.76–1.27)
GFR SERPLBLD CREATININE-BSD FMLA CKD-EPI: 80 ML/MIN/1.73
GFR SERPLBLD CREATININE-BSD FMLA CKD-EPI: 97 ML/MIN/1.73
GLUCOSE SERPL-MCNC: 89 MG/DL (ref 65–99)
POTASSIUM SERPL-SCNC: 4.9 MMOL/L (ref 3.5–5.2)
SODIUM SERPL-SCNC: 131 MMOL/L (ref 136–145)
TSH SERPL DL<=0.005 MIU/L-ACNC: 3.4 MIU/ML (ref 0.27–4.2)

## 2018-05-21 ENCOUNTER — OFFICE VISIT (OUTPATIENT)
Dept: INTERNAL MEDICINE | Facility: CLINIC | Age: 68
End: 2018-05-21

## 2018-05-21 VITALS
BODY MASS INDEX: 25.94 KG/M2 | HEIGHT: 76 IN | HEART RATE: 65 BPM | OXYGEN SATURATION: 99 % | DIASTOLIC BLOOD PRESSURE: 78 MMHG | SYSTOLIC BLOOD PRESSURE: 128 MMHG | WEIGHT: 213 LBS

## 2018-05-21 DIAGNOSIS — M79.89 LOCALIZED SWELLING OF BOTH LOWER EXTREMITIES: ICD-10-CM

## 2018-05-21 DIAGNOSIS — G40.909 SEIZURE DISORDER (HCC): ICD-10-CM

## 2018-05-21 DIAGNOSIS — E87.1 HYPONATREMIA: ICD-10-CM

## 2018-05-21 DIAGNOSIS — R53.83 FATIGUE, UNSPECIFIED TYPE: ICD-10-CM

## 2018-05-21 DIAGNOSIS — E87.1 HYPONATREMIA: Primary | ICD-10-CM

## 2018-05-21 PROCEDURE — 99214 OFFICE O/P EST MOD 30 MIN: CPT | Performed by: INTERNAL MEDICINE

## 2018-05-21 NOTE — PROGRESS NOTES
Chief Complaint  Kadeem Reyes is a 67 y.o. male who presents for Labs Only (Discuss lab results ) and Follow-up  .    History of Present Illness   Kadeem is here for follow up on his legs and his sodium.  He still has some swelling in his legs and feet but not as much as he had last week.  We ordered the echo which came back ok.   We did find his sodium to be quite low.   Repeat levels on Friday did come up some.  He drinks 3-5 beer a day.  I have asked him to reduce this to no more than one a day for the next two weeks.  He is fatigued and doesn't have the stamina he used to.  He denies any soa or cough.  No other physical symptoms other than the legs feeling tight and him being more fatigued.  For a while now he has been taking his keppra about twice a week.  Apparently, the last time he saw Dr. Son it was discussed possibly taking him off of this.  However, in reviewing her noted, it looks like she elected to continue this bid.  He has not been seeing any other neurologist since she left the practice.  He has not had any seizure activity.        Review of Systems   Constitution: Positive for malaise/fatigue. Negative for weakness.   Cardiovascular: Positive for leg swelling. Negative for chest pain, dyspnea on exertion and palpitations.   Respiratory: Negative for cough and shortness of breath.    Musculoskeletal: Positive for myalgias (legs) and stiffness (legs). Negative for falls.       Patient Active Problem List   Diagnosis   • Gait instability   • Hyponatremia   • Microscopic colitis   • Disorder of peripheral nervous system   • Seizure disorder   • DANIEL (obstructive sleep apnea)   • Elevated TSH   • Macrocytosis without anemia   • Rosacea       Past Medical History:   Diagnosis Date   • Gait instability    • History of gout    • Hyponatremia    • Intracranial hemorrhage    • Osteoarthritis    • Peripheral neuropathy    • Seizure disorder    • Seizures    • Sleep apnea    • Traumatic brain injury   "      Past Surgical History:   Procedure Laterality Date   • ABDOMINAL SURGERY     • COLONOSCOPY  03/27/2012    normal   • EYE SURGERY         Family History   Problem Relation Age of Onset   • Kidney cancer Mother    • Diabetes Father    • Diabetes Sister    • Leukemia Sister        Social History     Social History   • Marital status:      Spouse name: N/A   • Number of children: 2   • Years of education: N/A     Occupational History   • retired teacher      Social History Main Topics   • Smoking status: Never Smoker   • Smokeless tobacco: Never Used   • Alcohol use Yes      Comment: daily 3-4 beers   • Drug use: No   • Sexual activity: Not on file     Other Topics Concern   • Not on file     Social History Narrative   • No narrative on file       Current Outpatient Prescriptions on File Prior to Visit   Medication Sig Dispense Refill   • aspirin 81 MG EC tablet Take 81 mg by mouth Daily.     • Budesonide (ENTOCORT EC) 3 MG 24 hr capsule Take 1 capsule by mouth Daily As Needed (Loose stools). 30 capsule 5   • calcium carbonate-vitamin d (CALTRATE 600+D) 600-400 MG-UNIT per tablet Take  by mouth.     • Cholecalciferol (VITAMIN D) 1000 UNITS tablet Take  by mouth.     • fluocinonide (LIDEX) 0.05 % external solution APPLY TO SCALP D  8   • levETIRAcetam (KEPPRA) 500 MG tablet Take 1 tablet by mouth twice daily. 180 tablet 0   • vitamin E 600 UNIT capsule Take  by mouth.       No current facility-administered medications on file prior to visit.        Allergies   Allergen Reactions   • Phenytoin        Vitals:    05/21/18 1121   BP: 128/78   Pulse: 65   SpO2: 99%   Weight: 96.6 kg (213 lb)   Height: 193 cm (75.98\")       Body mass index is 25.94 kg/m².    Objective   Physical Exam   Constitutional: He is oriented to person, place, and time. He appears well-developed and well-nourished. No distress.   HENT:   Head: Normocephalic and atraumatic.   Mouth/Throat: No oropharyngeal exudate.   Eyes: Conjunctivae are " normal. No scleral icterus.   Neck: Normal range of motion. Neck supple.   Cardiovascular: Normal rate, regular rhythm and normal heart sounds.  Exam reveals no friction rub.    No murmur heard.  Pulmonary/Chest: Effort normal and breath sounds normal. No respiratory distress. He has no wheezes. He has no rales.   Musculoskeletal: He exhibits edema (1+ in ankles bilat).   Lymphadenopathy:     He has no cervical adenopathy.   Neurological: He is alert and oriented to person, place, and time. No cranial nerve deficit.   Psychiatric: He has a normal mood and affect. His behavior is normal. Judgment and thought content normal.       Results for orders placed or performed in visit on 05/18/18   Basic Metabolic Panel   Result Value Ref Range    Glucose 89 65 - 99 mg/dL    BUN 9 8 - 23 mg/dL    Creatinine 0.94 0.76 - 1.27 mg/dL    eGFR Non African Am 80 >60 mL/min/1.73    eGFR African Am 97 >60 mL/min/1.73    BUN/Creatinine Ratio 9.6 7.0 - 25.0    Sodium 131 (L) 136 - 145 mmol/L    Potassium 4.9 3.5 - 5.2 mmol/L    Chloride 93 (L) 98 - 107 mmol/L    Total CO2 26.3 22.0 - 29.0 mmol/L    Calcium 9.5 8.6 - 10.5 mg/dL   TSH   Result Value Ref Range    TSH 3.400 0.270 - 4.200 mIU/mL       Assessment/Plan   Diagnoses and all orders for this visit:    Hyponatremia    Seizure disorder  -     Ambulatory Referral to Neurology    Fatigue, unspecified type    Localized swelling of both lower extremities  -     Duplex Venous Lower Extremity - Bilateral CAR; Future        Discussion/Summary  Kadeem is here for follow up.  His sodium is mildly improved but not back to his baseline.  I have asked him to reduce beer intake to no more than one a day.  I would also like for him to reestablish with a neurologist to determine if he should continue the keppra.  I do not think taking it twice a day is adviseable and may be contributing to how he is feeling.  Will doppler his legs soon.  Will repeat a bmp in two weeks.  He did not have the urine  sodium or osmolality done on Friday with his repeat labs.  Will get that today.    No Follow-up on file.

## 2018-05-22 LAB
OSMOLALITY UR: 325 MOSM/KG
SODIUM 24H UR-SCNC: 44 MMOL/L

## 2018-05-24 ENCOUNTER — TELEPHONE (OUTPATIENT)
Dept: INTERNAL MEDICINE | Facility: CLINIC | Age: 68
End: 2018-05-24

## 2018-05-24 DIAGNOSIS — E87.1 HYPONATREMIA: Primary | ICD-10-CM

## 2018-05-24 DIAGNOSIS — R79.89 ELEVATED TSH: ICD-10-CM

## 2018-05-24 DIAGNOSIS — D75.89 MACROCYTOSIS WITHOUT ANEMIA: ICD-10-CM

## 2018-05-24 NOTE — TELEPHONE ENCOUNTER
----- Message from Jacquelyn Crowe MD sent at 5/23/2018  5:02 PM EDT -----  Please call and see how his swelling in his legs is.  I would like to repeat a BMP tomorrow (thursday) or Friday if possible to see if things have normalized.

## 2018-05-25 LAB
BUN SERPL-MCNC: 13 MG/DL (ref 8–23)
BUN/CREAT SERPL: 13.8 (ref 7–25)
CALCIUM SERPL-MCNC: 9.4 MG/DL (ref 8.6–10.5)
CHLORIDE SERPL-SCNC: 94 MMOL/L (ref 98–107)
CO2 SERPL-SCNC: 25.3 MMOL/L (ref 22–29)
CREAT SERPL-MCNC: 0.94 MG/DL (ref 0.76–1.27)
GFR SERPLBLD CREATININE-BSD FMLA CKD-EPI: 80 ML/MIN/1.73
GFR SERPLBLD CREATININE-BSD FMLA CKD-EPI: 97 ML/MIN/1.73
GLUCOSE SERPL-MCNC: 87 MG/DL (ref 65–99)
POTASSIUM SERPL-SCNC: 4.9 MMOL/L (ref 3.5–5.2)
SODIUM SERPL-SCNC: 132 MMOL/L (ref 136–145)

## 2018-05-29 ENCOUNTER — TELEPHONE (OUTPATIENT)
Dept: INTERNAL MEDICINE | Facility: CLINIC | Age: 68
End: 2018-05-29

## 2018-05-29 ENCOUNTER — HOSPITAL ENCOUNTER (OUTPATIENT)
Dept: CARDIOLOGY | Facility: HOSPITAL | Age: 68
Discharge: HOME | End: 2018-05-29
Admitting: INTERNAL MEDICINE

## 2018-05-29 DIAGNOSIS — M79.89 LOCALIZED SWELLING OF BOTH LOWER EXTREMITIES: ICD-10-CM

## 2018-05-29 LAB
BH CV LOWER VASCULAR LEFT COMMON FEMORAL AUGMENT: NORMAL
BH CV LOWER VASCULAR LEFT COMMON FEMORAL COMPETENT: NORMAL
BH CV LOWER VASCULAR LEFT COMMON FEMORAL COMPRESS: NORMAL
BH CV LOWER VASCULAR LEFT COMMON FEMORAL PHASIC: NORMAL
BH CV LOWER VASCULAR LEFT COMMON FEMORAL SPONT: NORMAL
BH CV LOWER VASCULAR LEFT DISTAL FEMORAL COMPRESS: NORMAL
BH CV LOWER VASCULAR LEFT GASTRONEMIUS COMPRESS: NORMAL
BH CV LOWER VASCULAR LEFT GREATER SAPH AK COMPRESS: NORMAL
BH CV LOWER VASCULAR LEFT GREATER SAPH BK COMPRESS: NORMAL
BH CV LOWER VASCULAR LEFT MID FEMORAL AUGMENT: NORMAL
BH CV LOWER VASCULAR LEFT MID FEMORAL COMPETENT: NORMAL
BH CV LOWER VASCULAR LEFT MID FEMORAL COMPRESS: NORMAL
BH CV LOWER VASCULAR LEFT MID FEMORAL PHASIC: NORMAL
BH CV LOWER VASCULAR LEFT MID FEMORAL SPONT: NORMAL
BH CV LOWER VASCULAR LEFT PERONEAL COMPRESS: NORMAL
BH CV LOWER VASCULAR LEFT POPLITEAL AUGMENT: NORMAL
BH CV LOWER VASCULAR LEFT POPLITEAL COMPETENT: NORMAL
BH CV LOWER VASCULAR LEFT POPLITEAL COMPRESS: NORMAL
BH CV LOWER VASCULAR LEFT POPLITEAL PHASIC: NORMAL
BH CV LOWER VASCULAR LEFT POPLITEAL SPONT: NORMAL
BH CV LOWER VASCULAR LEFT POSTERIOR TIBIAL COMPRESS: NORMAL
BH CV LOWER VASCULAR LEFT PROXIMAL FEMORAL COMPRESS: NORMAL
BH CV LOWER VASCULAR LEFT SAPHENOFEMORAL JUNCTION AUGMENT: NORMAL
BH CV LOWER VASCULAR LEFT SAPHENOFEMORAL JUNCTION COMPETENT: NORMAL
BH CV LOWER VASCULAR LEFT SAPHENOFEMORAL JUNCTION COMPRESS: NORMAL
BH CV LOWER VASCULAR LEFT SAPHENOFEMORAL JUNCTION PHASIC: NORMAL
BH CV LOWER VASCULAR LEFT SAPHENOFEMORAL JUNCTION SPONT: NORMAL
BH CV LOWER VASCULAR RIGHT COMMON FEMORAL AUGMENT: NORMAL
BH CV LOWER VASCULAR RIGHT COMMON FEMORAL COMPETENT: NORMAL
BH CV LOWER VASCULAR RIGHT COMMON FEMORAL COMPRESS: NORMAL
BH CV LOWER VASCULAR RIGHT COMMON FEMORAL PHASIC: NORMAL
BH CV LOWER VASCULAR RIGHT COMMON FEMORAL SPONT: NORMAL
BH CV LOWER VASCULAR RIGHT DISTAL FEMORAL COMPRESS: NORMAL
BH CV LOWER VASCULAR RIGHT GASTRONEMIUS COMPRESS: NORMAL
BH CV LOWER VASCULAR RIGHT GREATER SAPH AK COMPRESS: NORMAL
BH CV LOWER VASCULAR RIGHT GREATER SAPH BK COMPRESS: NORMAL
BH CV LOWER VASCULAR RIGHT MID FEMORAL AUGMENT: NORMAL
BH CV LOWER VASCULAR RIGHT MID FEMORAL COMPETENT: NORMAL
BH CV LOWER VASCULAR RIGHT MID FEMORAL COMPRESS: NORMAL
BH CV LOWER VASCULAR RIGHT MID FEMORAL PHASIC: NORMAL
BH CV LOWER VASCULAR RIGHT MID FEMORAL SPONT: NORMAL
BH CV LOWER VASCULAR RIGHT PERONEAL COMPRESS: NORMAL
BH CV LOWER VASCULAR RIGHT POPLITEAL AUGMENT: NORMAL
BH CV LOWER VASCULAR RIGHT POPLITEAL COMPETENT: NORMAL
BH CV LOWER VASCULAR RIGHT POPLITEAL COMPRESS: NORMAL
BH CV LOWER VASCULAR RIGHT POPLITEAL PHASIC: NORMAL
BH CV LOWER VASCULAR RIGHT POPLITEAL SPONT: NORMAL
BH CV LOWER VASCULAR RIGHT POSTERIOR TIBIAL COMPRESS: NORMAL
BH CV LOWER VASCULAR RIGHT PROXIMAL FEMORAL COMPRESS: NORMAL
BH CV LOWER VASCULAR RIGHT SAPHENOFEMORAL JUNCTION AUGMENT: NORMAL
BH CV LOWER VASCULAR RIGHT SAPHENOFEMORAL JUNCTION COMPETENT: NORMAL
BH CV LOWER VASCULAR RIGHT SAPHENOFEMORAL JUNCTION COMPRESS: NORMAL
BH CV LOWER VASCULAR RIGHT SAPHENOFEMORAL JUNCTION PHASIC: NORMAL
BH CV LOWER VASCULAR RIGHT SAPHENOFEMORAL JUNCTION SPONT: NORMAL

## 2018-05-29 PROCEDURE — 93970 EXTREMITY STUDY: CPT

## 2018-05-29 NOTE — TELEPHONE ENCOUNTER
----- Message from Jacquelyn Crowe MD sent at 5/28/2018  7:56 PM EDT -----  Please all- his sodium continues to improve.  He is up to 132.  Will continue to monitor with routine labs.

## 2018-06-01 ENCOUNTER — TELEPHONE (OUTPATIENT)
Dept: INTERNAL MEDICINE | Facility: CLINIC | Age: 68
End: 2018-06-01

## 2018-06-01 NOTE — TELEPHONE ENCOUNTER
Pt states that his leg is still swelling, denies redness, wants to know if this could be related to his low sodium.

## 2018-06-01 NOTE — TELEPHONE ENCOUNTER
----- Message from Jacquelyn Crowe MD sent at 5/31/2018  9:26 PM EDT -----  Please be sure pt is aware of the negative lower ext doppler

## 2018-06-04 NOTE — TELEPHONE ENCOUNTER
Please call - the leg swelling itself is unlikely due to the low sodium.  If he is still having swelling, the next step would be CT pelvis to look for something compressing his veins and causing swelling.  Is he agreeable to this plan?

## 2018-06-04 NOTE — TELEPHONE ENCOUNTER
Pt informed, states understanding.     Pt stated, he would like to wait and see if the swelling goes down in the near future, if it doesn't or worsens, he will call us back to order the imaging.

## 2018-06-18 ENCOUNTER — OFFICE VISIT (OUTPATIENT)
Dept: NEUROLOGY | Facility: CLINIC | Age: 68
End: 2018-06-18

## 2018-06-18 VITALS
DIASTOLIC BLOOD PRESSURE: 60 MMHG | WEIGHT: 195 LBS | SYSTOLIC BLOOD PRESSURE: 120 MMHG | BODY MASS INDEX: 23.75 KG/M2 | HEIGHT: 76 IN

## 2018-06-18 DIAGNOSIS — R26.81 GAIT INSTABILITY: Primary | ICD-10-CM

## 2018-06-18 DIAGNOSIS — G40.909 SEIZURE DISORDER (HCC): ICD-10-CM

## 2018-06-18 PROCEDURE — 99213 OFFICE O/P EST LOW 20 MIN: CPT | Performed by: PSYCHIATRY & NEUROLOGY

## 2018-06-18 RX ORDER — SODIUM PHOSPHATE,MONO-DIBASIC 19G-7G/118
ENEMA (ML) RECTAL
COMMUNITY

## 2018-06-18 RX ORDER — B-COMPLEX WITH VITAMIN C
TABLET ORAL
COMMUNITY
End: 2019-10-28

## 2018-06-18 NOTE — PROGRESS NOTES
Subjective:     Patient ID: Kadeem Reyes is a 67 y.o. male.    History of Present Illness     The patient was seen back in the office for follow-up of seizures and instability.  His last seizure was about 15 years ago.  He had been on Keppra 500 mg twice daily.  History also taken from the patient's daughter.  He is allergic to Dilantin.  He has not been taking his medicine for about 3 months.  The following portions of the patient's history were reviewed and updated as appropriate: allergies, current medications, past family history, past medical history, past social history, past surgical history and problem list.    Family History   Problem Relation Age of Onset   • Kidney cancer Mother    • Diabetes Father    • Diabetes Sister    • Leukemia Sister      Active Ambulatory Problems     Diagnosis Date Noted   • Gait instability 08/23/2016   • Hyponatremia 08/23/2016   • Microscopic colitis 08/23/2016   • Disorder of peripheral nervous system 08/23/2016   • Seizure disorder 08/23/2016   • DANIEL (obstructive sleep apnea) 09/08/2016   • Elevated TSH 09/08/2016   • Macrocytosis without anemia 09/08/2016   • Rosacea 09/08/2016     Resolved Ambulatory Problems     Diagnosis Date Noted   • No Resolved Ambulatory Problems     Past Medical History:   Diagnosis Date   • Gait instability    • History of gout    • Hyponatremia    • Intracranial hemorrhage    • Osteoarthritis    • Peripheral neuropathy    • Seizure disorder    • Seizures    • Sleep apnea    • Traumatic brain injury      Social History     Social History   • Marital status:      Spouse name: N/A   • Number of children: 2   • Years of education: N/A     Occupational History   • retired teacher      Social History Main Topics   • Smoking status: Never Smoker   • Smokeless tobacco: Never Used   • Alcohol use Yes      Comment: daily 3-4 beers   • Drug use: No   • Sexual activity: Not on file     Other Topics Concern   • Not on file     Social History Narrative    • No narrative on file       Current Outpatient Prescriptions:   •  aspirin 81 MG EC tablet, Take 81 mg by mouth Daily., Disp: , Rfl:   •  Budesonide (ENTOCORT EC) 3 MG 24 hr capsule, Take 1 capsule by mouth Daily As Needed (Loose stools)., Disp: 30 capsule, Rfl: 5  •  calcium carbonate-vitamin d (CALTRATE 600+D) 600-400 MG-UNIT per tablet, Take  by mouth., Disp: , Rfl:   •  Cholecalciferol (VITAMIN D) 1000 UNITS tablet, Take  by mouth., Disp: , Rfl:   •  fluocinonide (LIDEX) 0.05 % external solution, APPLY TO SCALP D, Disp: , Rfl: 8  •  glucosamine sulfate 500 MG capsule capsule, Take  by mouth 3 (Three) Times a Day With Meals., Disp: , Rfl:   •  vitamin E 600 UNIT capsule, Take  by mouth., Disp: , Rfl:   •  Zinc 100 MG tablet, Take  by mouth., Disp: , Rfl:     Review of Systems   Constitutional: Negative.    HENT: Negative.    Eyes: Negative.    Respiratory: Negative.    Cardiovascular: Negative.    Gastrointestinal: Negative.    Endocrine: Negative.    Musculoskeletal: Negative.    Skin: Negative.    Allergic/Immunologic: Negative.    Neurological: Negative.    Hematological: Negative.    Psychiatric/Behavioral: Negative.         Objective:    Neurologic Exam  Mental status examination was appropriate.  Funduscopy, visual fields, eye movements and pupillary reflexes were normal.  No facial weakness was noted.  Gait was what unsteady.  No pattern of focal weakness was noted.  Physical Exam    Assessment/Plan:     Kadeem was seen today for seizures.    Diagnoses and all orders for this visit:    Gait instability    Seizure disorder  -     EEG Awake or Asleep Routine; Future         His gait instability is related to the severe head trauma he suffered several years ago.  The fact that he is gone seizure-free for about 10-15 years and has had no seizures on 3 months without medicine makes me believe he does not need to continue to take medicine.  I set up an EEG.  If this is normal then he will continue without  seizure medicine and I will see him when necessary in the office.  I discussed this at length with the daughter as well. Thank you for allowing me to share in the care of this patient.  Parvez Lechuga M.D.

## 2018-06-26 ENCOUNTER — HOSPITAL ENCOUNTER (OUTPATIENT)
Dept: NEUROLOGY | Facility: HOSPITAL | Age: 68
Discharge: HOME | End: 2018-06-26
Attending: PSYCHIATRY & NEUROLOGY | Admitting: PSYCHIATRY & NEUROLOGY

## 2018-06-26 DIAGNOSIS — G40.909 SEIZURE DISORDER (HCC): ICD-10-CM

## 2018-06-26 PROCEDURE — 95819 EEG AWAKE AND ASLEEP: CPT

## 2018-06-26 PROCEDURE — 95816 EEG AWAKE AND DROWSY: CPT | Performed by: PSYCHIATRY & NEUROLOGY

## 2018-07-19 ENCOUNTER — TELEPHONE (OUTPATIENT)
Dept: NEUROLOGY | Facility: CLINIC | Age: 68
End: 2018-07-19

## 2018-07-19 NOTE — TELEPHONE ENCOUNTER
----- Message from Cristy Skinner sent at 7/19/2018  1:00 PM EDT -----  Contact: [phone]  Please call with EEg results

## 2018-07-19 NOTE — TELEPHONE ENCOUNTER
Please tell patient that the EEG was normal.  Okay to reduce Keppra to just one pill at night ; call in one month.

## 2018-08-30 ENCOUNTER — TELEPHONE (OUTPATIENT)
Dept: GASTROENTEROLOGY | Facility: CLINIC | Age: 68
End: 2018-08-30

## 2018-08-30 RX ORDER — BUDESONIDE 3 MG/1
CAPSULE, COATED PELLETS ORAL
Qty: 30 CAPSULE | Refills: 3 | Status: SHIPPED | OUTPATIENT
Start: 2018-08-30 | End: 2019-04-12 | Stop reason: SDUPTHER

## 2018-10-09 RX ORDER — LEVETIRACETAM 500 MG/1
500 TABLET ORAL DAILY
Qty: 30 TABLET | OUTPATIENT
Start: 2018-10-09

## 2018-10-10 RX ORDER — LEVETIRACETAM 500 MG/1
TABLET ORAL
Qty: 180 TABLET | Refills: 0 | OUTPATIENT
Start: 2018-10-10

## 2018-10-12 DIAGNOSIS — Z00.00 HEALTH CARE MAINTENANCE: Primary | ICD-10-CM

## 2018-10-12 DIAGNOSIS — D75.89 MACROCYTOSIS WITHOUT ANEMIA: ICD-10-CM

## 2018-10-12 DIAGNOSIS — E87.1 HYPONATREMIA: ICD-10-CM

## 2018-10-12 DIAGNOSIS — Z12.5 SCREENING FOR PROSTATE CANCER: ICD-10-CM

## 2018-10-12 DIAGNOSIS — R79.89 ELEVATED TSH: ICD-10-CM

## 2018-10-16 LAB
ALBUMIN SERPL-MCNC: 4.7 G/DL (ref 3.5–5.2)
ALBUMIN/GLOB SERPL: 1.8 G/DL
ALP SERPL-CCNC: 62 U/L (ref 39–117)
ALT SERPL-CCNC: 17 U/L (ref 1–41)
APPEARANCE UR: CLEAR
AST SERPL-CCNC: 17 U/L (ref 1–40)
BACTERIA #/AREA URNS HPF: ABNORMAL /HPF
BASOPHILS # BLD AUTO: 0.01 10*3/MM3 (ref 0–0.2)
BASOPHILS NFR BLD AUTO: 0.2 % (ref 0–1.5)
BILIRUB SERPL-MCNC: 0.7 MG/DL (ref 0.1–1.2)
BILIRUB UR QL STRIP: NEGATIVE
BUN SERPL-MCNC: 10 MG/DL (ref 8–23)
BUN/CREAT SERPL: 11 (ref 7–25)
CALCIUM SERPL-MCNC: 9.6 MG/DL (ref 8.6–10.5)
CHLORIDE SERPL-SCNC: 95 MMOL/L (ref 98–107)
CHOLEST SERPL-MCNC: 166 MG/DL (ref 0–200)
CO2 SERPL-SCNC: 26.3 MMOL/L (ref 22–29)
COLOR UR: YELLOW
CREAT SERPL-MCNC: 0.91 MG/DL (ref 0.76–1.27)
CRYSTALS URNS MICRO: ABNORMAL
EOSINOPHIL # BLD AUTO: 0.07 10*3/MM3 (ref 0–0.7)
EOSINOPHIL NFR BLD AUTO: 1.5 % (ref 0.3–6.2)
EPI CELLS #/AREA URNS HPF: ABNORMAL /HPF
ERYTHROCYTE [DISTWIDTH] IN BLOOD BY AUTOMATED COUNT: 12.5 % (ref 11.5–14.5)
GLOBULIN SER CALC-MCNC: 2.6 GM/DL
GLUCOSE SERPL-MCNC: 89 MG/DL (ref 65–99)
GLUCOSE UR QL: NEGATIVE
HBA1C MFR BLD: <4.3 % (ref 4.8–5.6)
HCT VFR BLD AUTO: 43.4 % (ref 40.4–52.2)
HDLC SERPL-MCNC: 87 MG/DL (ref 40–60)
HGB BLD-MCNC: 14.7 G/DL (ref 13.7–17.6)
HGB UR QL STRIP: NEGATIVE
IMM GRANULOCYTES # BLD: 0 10*3/MM3 (ref 0–0.03)
IMM GRANULOCYTES NFR BLD: 0 % (ref 0–0.5)
KETONES UR QL STRIP: NEGATIVE
LDLC SERPL CALC-MCNC: 69 MG/DL (ref 0–100)
LEUKOCYTE ESTERASE UR QL STRIP: NEGATIVE
LYMPHOCYTES # BLD AUTO: 1.37 10*3/MM3 (ref 0.9–4.8)
LYMPHOCYTES NFR BLD AUTO: 29.2 % (ref 19.6–45.3)
MCH RBC QN AUTO: 33.6 PG (ref 27–32.7)
MCHC RBC AUTO-ENTMCNC: 33.9 G/DL (ref 32.6–36.4)
MCV RBC AUTO: 99.1 FL (ref 79.8–96.2)
MICRO URNS: NORMAL
MICRO URNS: NORMAL
MONOCYTES # BLD AUTO: 0.65 10*3/MM3 (ref 0.2–1.2)
MONOCYTES NFR BLD AUTO: 13.9 % (ref 5–12)
MUCOUS THREADS URNS QL MICRO: PRESENT /HPF
NEUTROPHILS # BLD AUTO: 2.59 10*3/MM3 (ref 1.9–8.1)
NEUTROPHILS NFR BLD AUTO: 55.2 % (ref 42.7–76)
NITRITE UR QL STRIP: NEGATIVE
PH UR STRIP: 6.5 [PH] (ref 5–7.5)
PLATELET # BLD AUTO: 182 10*3/MM3 (ref 140–500)
POTASSIUM SERPL-SCNC: 4.8 MMOL/L (ref 3.5–5.2)
PROT SERPL-MCNC: 7.3 G/DL (ref 6–8.5)
PROT UR QL STRIP: NEGATIVE
PSA SERPL-MCNC: 3.39 NG/ML (ref 0–4)
RBC # BLD AUTO: 4.38 10*6/MM3 (ref 4.6–6)
RBC #/AREA URNS HPF: ABNORMAL /HPF
SODIUM SERPL-SCNC: 134 MMOL/L (ref 136–145)
SP GR UR: 1.02 (ref 1–1.03)
TRIGL SERPL-MCNC: 52 MG/DL (ref 0–150)
TSH SERPL DL<=0.005 MIU/L-ACNC: 3.1 MIU/ML (ref 0.27–4.2)
UNIDENT CRYS URNS QL MICRO: PRESENT /LPF
URINALYSIS REFLEX: NORMAL
UROBILINOGEN UR STRIP-MCNC: 0.2 MG/DL (ref 0.2–1)
VLDLC SERPL CALC-MCNC: 10.4 MG/DL (ref 5–40)
WBC # BLD AUTO: 4.69 10*3/MM3 (ref 4.5–10.7)
WBC #/AREA URNS HPF: ABNORMAL /HPF

## 2018-10-18 ENCOUNTER — OFFICE VISIT (OUTPATIENT)
Dept: INTERNAL MEDICINE | Facility: CLINIC | Age: 68
End: 2018-10-18

## 2018-10-18 VITALS
BODY MASS INDEX: 25.57 KG/M2 | OXYGEN SATURATION: 99 % | RESPIRATION RATE: 18 BRPM | SYSTOLIC BLOOD PRESSURE: 122 MMHG | DIASTOLIC BLOOD PRESSURE: 66 MMHG | HEIGHT: 76 IN | WEIGHT: 210 LBS | HEART RATE: 63 BPM

## 2018-10-18 DIAGNOSIS — Z23 NEED FOR INFLUENZA VACCINATION: ICD-10-CM

## 2018-10-18 DIAGNOSIS — Z00.00 MEDICARE ANNUAL WELLNESS VISIT, SUBSEQUENT: ICD-10-CM

## 2018-10-18 DIAGNOSIS — Z00.00 HEALTH MAINTENANCE EXAMINATION: Primary | ICD-10-CM

## 2018-10-18 PROCEDURE — G0008 ADMIN INFLUENZA VIRUS VAC: HCPCS | Performed by: INTERNAL MEDICINE

## 2018-10-18 PROCEDURE — 90662 IIV NO PRSV INCREASED AG IM: CPT | Performed by: INTERNAL MEDICINE

## 2018-10-18 PROCEDURE — G0439 PPPS, SUBSEQ VISIT: HCPCS | Performed by: INTERNAL MEDICINE

## 2018-10-18 PROCEDURE — 99397 PER PM REEVAL EST PAT 65+ YR: CPT | Performed by: INTERNAL MEDICINE

## 2018-10-18 NOTE — PATIENT INSTRUCTIONS
Medicare Wellness  Personal Prevention Plan of Service     Date of Office Visit:  10/18/2018  Encounter Provider:  Jacquelyn Crowe MD  Place of Service:  [facility]  Patient Name: Kadeem Reyes  :  1950    As part of the Medicare Wellness portion of your visit today, we are providing you with this personalized preventive plan of services (PPPS). This plan is based upon recommendations of the [place] Preventive Services Task Force (USPSTF) and the Advisory Committee on Immunization Practices (ACIP).    This lists the preventive care services that should be considered, and provides dates of when you are due. Items listed as completed are up-to-date and do not require any further intervention.    Health Maintenance   Topic Date Due   • ZOSTER VACCINE (1 of 2) 2001   • INFLUENZA VACCINE  2018   • MEDICARE ANNUAL WELLNESS  2018   • TDAP/TD VACCINES (2 - Td) 2021   • COLONOSCOPY  2022   • HEPATITIS C SCREENING  Completed   • PNEUMOCOCCAL VACCINES (65+ LOW/MEDIUM RISK)  Completed       No orders of the defined types were placed in this encounter.      No Follow-up on file.

## 2018-10-18 NOTE — PROGRESS NOTES
Medicare Annual Wellness Visit and CPE    Chief Complaint:  Annual Exam (SUB AWV & CPE)      History of Present Illness    Kadeem Reyes is a 68 y.o. male who presents for an Annual Wellness Visit and CPE. In addition, we addressed the following health issues:    C-scope: 3/27/2012, repeat in 10 years.   Dental Exam: Every 6 months, utd.   Eye Exam: Yearly, utd.   Exercise:  Started going back to the gym, tries to stay active.   Flu shot: needs  Prostate exam:  Just saw his urologist.    Advanced Care Planning:  has NO advance directive - not interested in additional information   Immunization History   Administered Date(s) Administered   • Flu Mist 09/29/2013, 08/31/2015   • Pneumococcal Conjugate 13-Valent (PCV13) 09/08/2016   • Pneumococcal Polysaccharide (PPSV23) 09/13/2017   • Tdap 01/01/2011   • influenza Split 09/09/2016     He stopped his keppra because the neurologist said he didn't need it any more.  He's been off it for 3 weeks.  No seizures.  In general, he's feeling ok  He hasn't been exercising as much because he's doing more projects around the house.  He wants to get back to the gym because he feels like he would feel better and sleep better.      Review of Systems   Constitution: Negative for chills, fever and malaise/fatigue.   HENT: Positive for hearing loss. Negative for hoarse voice and sore throat.    Eyes: Negative for blurred vision, double vision and visual disturbance.   Cardiovascular: Negative for chest pain, leg swelling and palpitations.   Respiratory: Negative for cough and shortness of breath.    Endocrine: Negative for polydipsia and polyuria.   Hematologic/Lymphatic: Does not bruise/bleed easily.   Skin: Negative for rash and suspicious lesions.   Musculoskeletal: Negative for arthritis and myalgias.   Gastrointestinal: Negative for bloating, abdominal pain, change in bowel habit, constipation, diarrhea, dysphagia, hematochezia, melena, nausea and vomiting.   Genitourinary: Negative  for dysuria and hematuria.   Neurological: Negative for dizziness, headaches and light-headedness.   Psychiatric/Behavioral: Negative for depression. The patient is not nervous/anxious.        Past Medical History:   Diagnosis Date   • Gait instability    • History of gout    • Hyponatremia    • Intracranial hemorrhage (CMS/HCC)    • Osteoarthritis    • Peripheral neuropathy    • Seizure disorder (CMS/HCC)    • Seizures (CMS/HCC)    • Sleep apnea    • Traumatic brain injury (CMS/HCC)        Past Surgical History:   Procedure Laterality Date   • ABDOMINAL SURGERY     • COLONOSCOPY  03/27/2012    normal   • EYE SURGERY         Social History     Social History   • Marital status:      Spouse name: N/A   • Number of children: 2   • Years of education: N/A     Occupational History   • retired teacher      Social History Main Topics   • Smoking status: Never Smoker   • Smokeless tobacco: Never Used   • Alcohol use Yes      Comment: daily 3-4 beers   • Drug use: No   • Sexual activity: Not on file     Other Topics Concern   • Not on file     Social History Narrative   • No narrative on file       Family History   Problem Relation Age of Onset   • Kidney cancer Mother    • Diabetes Father    • Diabetes Sister         2 sisters   • Leukemia Sister    • Stroke Sister        Allergies   Allergen Reactions   • Phenytoin        Current Outpatient Prescriptions on File Prior to Visit   Medication Sig Dispense Refill   • aspirin 81 MG EC tablet Take 81 mg by mouth Daily.     • Budesonide (ENTOCORT EC) 3 MG 24 hr capsule TAKE ONE CAPSULE BY MOUTH DAILY AS NEEDED FOR LOOSE STOOLS 30 capsule 3   • calcium carbonate-vitamin d (CALTRATE 600+D) 600-400 MG-UNIT per tablet Take  by mouth.     • Cholecalciferol (VITAMIN D) 1000 UNITS tablet Take  by mouth.     • fluocinonide (LIDEX) 0.05 % external solution APPLY TO SCALP D  8   • glucosamine sulfate 500 MG capsule capsule Take  by mouth 3 (Three) Times a Day With Meals.     •  "vitamin E 600 UNIT capsule Take  by mouth.     • Zinc 100 MG tablet Take  by mouth.       No current facility-administered medications on file prior to visit.        Patient Active Problem List   Diagnosis   • Gait instability   • Hyponatremia   • Microscopic colitis   • Disorder of peripheral nervous system   • Seizure disorder (CMS/HCC)   • DANIEL (obstructive sleep apnea)   • Elevated TSH   • Macrocytosis without anemia   • Rosacea       Health Risk Assessment/Depression Screen/Functional and Cognitive Screening:   The patient has completed a Health Risk Assessment & Depression screen. These have been reviewed with them and have been scanned as a separate documents.    Age-appropriate Screening Schedule:  Refer to the list below for future screening recommendations based on patient's age. Orders for these recommended tests are listed in the plan section. The patient has been provided with a written plan.     I have reviewed their problem list, past medical history, family history, social history, and surgical history.     Vitals:    10/18/18 0935   BP: 122/66   Pulse: 63   Resp: 18   SpO2: 99%   Weight: 95.3 kg (210 lb)   Height: 193 cm (75.98\")   PainSc: 0-No pain       Body mass index is 25.57 kg/m².    Physical Exam   Constitutional: He is oriented to person, place, and time. He appears well-developed and well-nourished. No distress.   HENT:   Head: Normocephalic and atraumatic.   Right Ear: Hearing and external ear normal.   Left Ear: Hearing and external ear normal.   Nose: Nose normal.   Mouth/Throat: Oropharynx is clear and moist and mucous membranes are normal.   Eyes: Pupils are equal, round, and reactive to light. Conjunctivae, EOM and lids are normal. No scleral icterus.   Neck: Trachea normal and normal range of motion. Neck supple.   Cardiovascular: Normal rate, regular rhythm and normal heart sounds.  Exam reveals no gallop and no friction rub.    No murmur heard.  Pulses:       Carotid pulses are 2+ " on the right side, and 2+ on the left side.       Femoral pulses are 2+ on the right side, and 2+ on the left side.       Dorsalis pedis pulses are 2+ on the right side, and 2+ on the left side.        Posterior tibial pulses are 2+ on the right side, and 2+ on the left side.   Pulmonary/Chest: Effort normal and breath sounds normal. No respiratory distress. He has no wheezes. He has no rales.   Abdominal: Soft. Normal appearance and bowel sounds are normal. He exhibits no distension and no mass. There is no tenderness.   Musculoskeletal: Normal range of motion. He exhibits no edema.     Vascular Status -  His right foot exhibits no edema. His left foot exhibits no edema.  Skin Integrity  -  His right foot skin is intact.His left foot skin is intact..  Lymphadenopathy:     He has no cervical adenopathy.        Right: No inguinal and no supraclavicular adenopathy present.        Left: No inguinal and no supraclavicular adenopathy present.   Neurological: He is alert and oriented to person, place, and time. He has normal strength. No cranial nerve deficit. He exhibits normal muscle tone. Coordination and gait normal.   Skin: Skin is warm and dry. No rash noted.   Psychiatric: He has a normal mood and affect. His speech is normal and behavior is normal. Judgment and thought content normal. Cognition and memory are normal.   Vitals reviewed.      Results for orders placed or performed in visit on 10/12/18   Comprehensive Metabolic Panel   Result Value Ref Range    Glucose 89 65 - 99 mg/dL    BUN 10 8 - 23 mg/dL    Creatinine 0.91 0.76 - 1.27 mg/dL    eGFR Non African Am 83 >60 mL/min/1.73    eGFR African Am 101 >60 mL/min/1.73    BUN/Creatinine Ratio 11.0 7.0 - 25.0    Sodium 134 (L) 136 - 145 mmol/L    Potassium 4.8 3.5 - 5.2 mmol/L    Chloride 95 (L) 98 - 107 mmol/L    Total CO2 26.3 22.0 - 29.0 mmol/L    Calcium 9.6 8.6 - 10.5 mg/dL    Total Protein 7.3 6.0 - 8.5 g/dL    Albumin 4.70 3.50 - 5.20 g/dL    Globulin 2.6  gm/dL    A/G Ratio 1.8 g/dL    Total Bilirubin 0.7 0.1 - 1.2 mg/dL    Alkaline Phosphatase 62 39 - 117 U/L    AST (SGOT) 17 1 - 40 U/L    ALT (SGPT) 17 1 - 41 U/L   Lipid Panel   Result Value Ref Range    Total Cholesterol 166 0 - 200 mg/dL    Triglycerides 52 0 - 150 mg/dL    HDL Cholesterol 87 (H) 40 - 60 mg/dL    VLDL Cholesterol 10.4 5 - 40 mg/dL    LDL Cholesterol  69 0 - 100 mg/dL   TSH Rfx On Abnormal To Free T4   Result Value Ref Range    TSH 3.10 0.27 - 4.2 mIU/mL   Hemoglobin A1c   Result Value Ref Range    Hemoglobin A1C <4.30 (L) 4.80 - 5.60 %   Urinalysis With Culture If Indicated - Urine, Clean Catch   Result Value Ref Range    Specific Gravity, UA 1.016 1.005 - 1.030    pH, UA 6.5 5.0 - 7.5    Color, UA Yellow Yellow    Appearance, UA Clear Clear    Leukocytes, UA Negative Negative    Protein Negative Negative/Trace    Glucose, UA Negative Negative    Ketones Negative Negative    Blood, UA Negative Negative    Bilirubin, UA Negative Negative    Urobilinogen, UA 0.2 0.2 - 1.0 mg/dL    Nitrite, UA Negative Negative    Microscopic Examination Comment     MICROSCOPIC EXAMINATION See below:     Urinalysis Reflex Comment    PSA DIAGNOSTIC   Result Value Ref Range    PSA 3.390 0.000 - 4.000 ng/mL   Microscopic Examination   Result Value Ref Range    WBC, UA 0-5 0 - 5 /hpf    RBC, UA 0-2 0 - 2 /hpf    Epithelial Cells (non renal) None seen 0 - 10 /hpf    Crystals, UA Present (A) N/A /lpf    Crystal Type Amorphous Sediment N/A    Mucus, UA Present Not Estab. /hpf    Bacteria, UA None seen None seen/Few /hpf   CBC & Differential   Result Value Ref Range    WBC 4.69 4.50 - 10.70 10*3/mm3    RBC 4.38 (L) 4.60 - 6.00 10*6/mm3    Hemoglobin 14.7 13.7 - 17.6 g/dL    Hematocrit 43.4 40.4 - 52.2 %    MCV 99.1 (H) 79.8 - 96.2 fL    MCH 33.6 (H) 27.0 - 32.7 pg    MCHC 33.9 32.6 - 36.4 g/dL    RDW 12.5 11.5 - 14.5 %    Platelets 182 140 - 500 10*3/mm3    Neutrophil Rel % 55.2 42.7 - 76.0 %    Lymphocyte Rel % 29.2 19.6 -  45.3 %    Monocyte Rel % 13.9 (H) 5.0 - 12.0 %    Eosinophil Rel % 1.5 0.3 - 6.2 %    Basophil Rel % 0.2 0.0 - 1.5 %    Neutrophils Absolute 2.59 1.90 - 8.10 10*3/mm3    Lymphocytes Absolute 1.37 0.90 - 4.80 10*3/mm3    Monocytes Absolute 0.65 0.20 - 1.20 10*3/mm3    Eosinophils Absolute 0.07 0.00 - 0.70 10*3/mm3    Basophils Absolute 0.01 0.00 - 0.20 10*3/mm3    Immature Granulocyte Rel % 0.0 0.0 - 0.5 %    Immature Grans Absolute 0.00 0.00 - 0.03 10*3/mm3       Assessment & Plan:    Diagnoses and all orders for this visit:    Health maintenance examination    Medicare annual wellness visit, subsequent    Need for influenza vaccination  -     Fluzone High Dose =>65Years        Discussion/Summary  Kadeem is here for his CPE and AWV.  In general, he's doing well. His labs all look stable.  I have encouraged him to get back to the gym and to consider reducing his daily beer consumption.  He will look into the shingles vaccine at his local pharmacy.        Follow Up:  Return in about 1 year (around 10/18/2019) for Annual physical, with fasting labs prior.     An After Visit Summary and PPPS with all of these plans were given to the patient.

## 2018-10-19 ENCOUNTER — TELEPHONE (OUTPATIENT)
Dept: NEUROLOGY | Facility: CLINIC | Age: 68
End: 2018-10-19

## 2018-10-19 NOTE — TELEPHONE ENCOUNTER
----- Message from Parvez Lechuga Jr., MD sent at 10/18/2018  9:48 AM EDT -----  Contact: Wife - [phone]  Okay to discontinue Keppra at this point, discussed this in the office  jmck  ----- Message -----  From: Polina Tovar MA  Sent: 10/17/2018   9:41 AM  To: Parvez Lechuga Jr., MD        ----- Message -----  From: Rachael Kendrick  Sent: 10/11/2018   3:49 PM  To: Polina Tovar MA    Wife sts she requested pts generic Keppra, but pharmacy said we denied it due to pt no longer on this med. Wife sts he is still on it, that Gray has him taking it at night.  He has none, but will get a couple from the pharmacy but will need refill tomorrow, early.

## 2019-04-05 RX ORDER — BUDESONIDE 3 MG/1
CAPSULE, COATED PELLETS ORAL
Qty: 30 CAPSULE | Refills: 0 | OUTPATIENT
Start: 2019-04-05

## 2019-04-11 ENCOUNTER — TELEPHONE (OUTPATIENT)
Dept: GASTROENTEROLOGY | Facility: CLINIC | Age: 69
End: 2019-04-11

## 2019-04-11 NOTE — TELEPHONE ENCOUNTER
----- Message from Lory Zheng sent at 4/11/2019  2:34 PM EDT -----  Regarding: pt called back   Contact: [phone]  Pt is asking can he get his meds refilled since he has an appt coming up

## 2019-04-12 RX ORDER — BUDESONIDE 3 MG/1
CAPSULE, COATED PELLETS ORAL
Qty: 30 CAPSULE | Refills: 0 | Status: SHIPPED | OUTPATIENT
Start: 2019-04-12 | End: 2019-05-06 | Stop reason: SDUPTHER

## 2019-05-06 ENCOUNTER — OFFICE VISIT (OUTPATIENT)
Dept: GASTROENTEROLOGY | Facility: CLINIC | Age: 69
End: 2019-05-06

## 2019-05-06 VITALS
SYSTOLIC BLOOD PRESSURE: 128 MMHG | DIASTOLIC BLOOD PRESSURE: 74 MMHG | WEIGHT: 212 LBS | BODY MASS INDEX: 25.82 KG/M2 | HEIGHT: 76 IN | TEMPERATURE: 98.9 F

## 2019-05-06 DIAGNOSIS — K52.839 MICROSCOPIC COLITIS, UNSPECIFIED MICROSCOPIC COLITIS TYPE: ICD-10-CM

## 2019-05-06 DIAGNOSIS — R19.7 DIARRHEA, UNSPECIFIED TYPE: Primary | ICD-10-CM

## 2019-05-06 PROCEDURE — 99213 OFFICE O/P EST LOW 20 MIN: CPT | Performed by: INTERNAL MEDICINE

## 2019-05-06 RX ORDER — BUDESONIDE 3 MG/1
CAPSULE, COATED PELLETS ORAL
Qty: 30 CAPSULE | Refills: 4 | Status: SHIPPED | OUTPATIENT
Start: 2019-05-06 | End: 2019-10-28 | Stop reason: SDUPTHER

## 2019-05-06 NOTE — PROGRESS NOTES
Chief Complaint   Patient presents with   • Diarrhea        Kadeem Reyes is a  68 y.o. male here for a follow up visit for microscopic colitis    HPI this 68-year-old white male patient of Dr. Jacquelyn Crowe returns in follow-up since last seen in October 2017.  He had been given a diagnosis of microscopic colitis dating back to 2012.  He is extremely responsive to budesonide therapy and requires only occasional dose or 2 to control his urgent bowel function.  Normal bowel pattern persist 70% of the time with occasional loose stool 15% of the time he may have an explosive bowel movement usually this occurs once per month.  He denies any melena or bright red blood per rectum, there is no abdominal pain associated with this.  His weight is been stable.  I advised we continue his current medical regimen as needed and follow-up on an annual basis.  He would require follow-up colonoscopy in 2022.    Past Medical History:   Diagnosis Date   • Gait instability    • History of gout    • Hyponatremia    • Intracranial hemorrhage (CMS/HCC)    • Osteoarthritis    • Peripheral neuropathy    • Seizure disorder (CMS/HCC)    • Seizures (CMS/HCC)    • Sleep apnea    • Traumatic brain injury (CMS/HCC)        Current Outpatient Medications   Medication Sig Dispense Refill   • aspirin 81 MG EC tablet Take 81 mg by mouth Daily.     • Budesonide (ENTOCORT EC) 3 MG 24 hr capsule Take one capsule by mouth daily as needed for loose stools 30 capsule 0   • calcium carbonate-vitamin d (CALTRATE 600+D) 600-400 MG-UNIT per tablet Take  by mouth.     • Cholecalciferol (VITAMIN D) 1000 UNITS tablet Take  by mouth.     • fluocinonide (LIDEX) 0.05 % external solution APPLY TO SCALP D  8   • glucosamine sulfate 500 MG capsule capsule Take  by mouth 3 (Three) Times a Day With Meals.     • vitamin E 600 UNIT capsule Take  by mouth.     • Zinc 100 MG tablet Take  by mouth.       No current facility-administered medications for this visit.        PRN  Meds:.    Allergies   Allergen Reactions   • Phenytoin        Social History     Socioeconomic History   • Marital status:      Spouse name: Not on file   • Number of children: 2   • Years of education: Not on file   • Highest education level: Not on file   Occupational History   • Occupation: retired teacher   Tobacco Use   • Smoking status: Never Smoker   • Smokeless tobacco: Never Used   Substance and Sexual Activity   • Alcohol use: Yes     Comment: daily 3-4 beers   • Drug use: No       Family History   Problem Relation Age of Onset   • Kidney cancer Mother    • Diabetes Father    • Diabetes Sister         2 sisters   • Leukemia Sister    • Stroke Sister        Review of Systems   Constitutional: Negative for activity change, appetite change, fatigue and unexpected weight change.   HENT: Negative for congestion, facial swelling, sore throat, trouble swallowing and voice change.    Eyes: Negative for photophobia and visual disturbance.   Respiratory: Negative for cough and choking.    Cardiovascular: Negative for chest pain.   Gastrointestinal: Positive for diarrhea. Negative for abdominal distention, abdominal pain, anal bleeding, blood in stool, constipation, nausea, rectal pain and vomiting.   Endocrine: Negative for polyphagia.   Musculoskeletal: Negative for arthralgias, gait problem and joint swelling.   Skin: Negative for color change, pallor and rash.   Allergic/Immunologic: Negative for food allergies.   Neurological: Negative for speech difficulty and headaches.   Hematological: Does not bruise/bleed easily.   Psychiatric/Behavioral: Negative for agitation, confusion and sleep disturbance.       Vitals:    05/06/19 1414   BP: 128/74   Temp: 98.9 °F (37.2 °C)       Physical Exam   Constitutional: He is oriented to person, place, and time. He appears well-developed and well-nourished.   HENT:   Head: Normocephalic.   Mouth/Throat: Oropharynx is clear and moist.   Eyes: Conjunctivae and EOM are  normal.   Neck: Normal range of motion.   Cardiovascular: Normal rate and regular rhythm.   Pulmonary/Chest: Breath sounds normal.   Abdominal: Soft. Bowel sounds are normal.   Musculoskeletal: Normal range of motion.   Neurological: He is alert and oriented to person, place, and time.   Skin: Skin is warm and dry.   Psychiatric: He has a normal mood and affect. His behavior is normal.       ASSESSMENT   #1 microscopic colitis: Managed with budesonide taken on an occasional basis      PLAN  Prescription for budesonide 3 mg tablets 1 every 8 hours as needed for explosive diarrhea, #30, 4 refills  Office follow-up in 1 year      ICD-10-CM ICD-9-CM   1. Diarrhea, unspecified type R19.7 787.91   2. Microscopic colitis, unspecified microscopic colitis type K52.839 558.9

## 2019-08-07 ENCOUNTER — OFFICE VISIT (OUTPATIENT)
Dept: INTERNAL MEDICINE | Facility: CLINIC | Age: 69
End: 2019-08-07

## 2019-08-07 VITALS
BODY MASS INDEX: 24.72 KG/M2 | HEIGHT: 76 IN | WEIGHT: 203 LBS | HEART RATE: 80 BPM | SYSTOLIC BLOOD PRESSURE: 118 MMHG | OXYGEN SATURATION: 98 % | TEMPERATURE: 98.1 F | DIASTOLIC BLOOD PRESSURE: 64 MMHG

## 2019-08-07 DIAGNOSIS — R20.0 BILATERAL ARM NUMBNESS AND TINGLING WHILE SLEEPING: ICD-10-CM

## 2019-08-07 DIAGNOSIS — R20.2 NUMBNESS AND TINGLING IN LEFT HAND: ICD-10-CM

## 2019-08-07 DIAGNOSIS — R20.0 NUMBNESS AND TINGLING IN RIGHT HAND: ICD-10-CM

## 2019-08-07 DIAGNOSIS — R20.2 NUMBNESS AND TINGLING IN RIGHT HAND: ICD-10-CM

## 2019-08-07 DIAGNOSIS — R20.0 NUMBNESS AND TINGLING IN LEFT HAND: ICD-10-CM

## 2019-08-07 DIAGNOSIS — R20.2 BILATERAL ARM NUMBNESS AND TINGLING WHILE SLEEPING: ICD-10-CM

## 2019-08-07 DIAGNOSIS — I87.2 VENOUS INSUFFICIENCY OF BOTH LOWER EXTREMITIES: Primary | ICD-10-CM

## 2019-08-07 PROCEDURE — 99214 OFFICE O/P EST MOD 30 MIN: CPT | Performed by: INTERNAL MEDICINE
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
## 2019-08-22 ENCOUNTER — TELEPHONE (OUTPATIENT)
Dept: INTERNAL MEDICINE | Facility: CLINIC | Age: 69
End: 2019-08-22

## 2019-08-22 NOTE — TELEPHONE ENCOUNTER
I have contacted scheduling and Dr. Savage's office and they can not get him in any sooner than September 9th. I made an appointment for pt for tomorrow to see Dr. Squires to maybe give him some pain medication

## 2019-08-22 NOTE — TELEPHONE ENCOUNTER
Pt's wife is calling about his extreme pain in his hands are night. The pain is so bad at night that he is having trouble sleeping and they almost took him to the ER due to pain 2 nights ago. He has a NCS on september 9th and they are wanting to know if we can call and see if we can get the appointment moved up sooner. The wife called and talked with the office but the office told her that the dr's office would have to call to try and get the appointment moved up sooner.

## 2019-08-22 NOTE — TELEPHONE ENCOUNTER
Yes, please call and see if can move up.  Pain seems to be progressive from our visit and somewhat out of proportion to suspected dx.  I would like to see study ASAP as well.

## 2019-08-23 ENCOUNTER — OFFICE VISIT (OUTPATIENT)
Dept: INTERNAL MEDICINE | Facility: CLINIC | Age: 69
End: 2019-08-23

## 2019-08-23 VITALS
HEIGHT: 76 IN | DIASTOLIC BLOOD PRESSURE: 74 MMHG | RESPIRATION RATE: 16 BRPM | WEIGHT: 201 LBS | BODY MASS INDEX: 24.48 KG/M2 | SYSTOLIC BLOOD PRESSURE: 126 MMHG | OXYGEN SATURATION: 99 % | HEART RATE: 82 BPM | TEMPERATURE: 98.2 F

## 2019-08-23 DIAGNOSIS — R29.898 BILATERAL ARM WEAKNESS: ICD-10-CM

## 2019-08-23 DIAGNOSIS — M79.641 BILATERAL HAND PAIN: ICD-10-CM

## 2019-08-23 DIAGNOSIS — M79.89 SWELLING OF BOTH HANDS: ICD-10-CM

## 2019-08-23 DIAGNOSIS — Z87.820 HISTORY OF TRAUMATIC BRAIN INJURY: ICD-10-CM

## 2019-08-23 DIAGNOSIS — R20.0 BILATERAL HAND NUMBNESS: Primary | ICD-10-CM

## 2019-08-23 DIAGNOSIS — M79.642 BILATERAL HAND PAIN: ICD-10-CM

## 2019-08-23 PROCEDURE — 99214 OFFICE O/P EST MOD 30 MIN: CPT | Performed by: INTERNAL MEDICINE

## 2019-08-23 RX ORDER — TRAMADOL HYDROCHLORIDE 50 MG/1
50 TABLET ORAL NIGHTLY PRN
Qty: 30 TABLET | Refills: 0 | Status: SHIPPED | OUTPATIENT
Start: 2019-08-23 | End: 2020-11-05

## 2019-08-23 NOTE — PROGRESS NOTES
"Hand Pain (Bi-lateral hand pain, going to see hand surgeon in september, but cannot sleep with the pain and ache. )      HPI  Kadeem Reyes is a 68 y.o. male RTC In short interval f/u:    \"Pain in hands is bad\". Notes has gotten much worse in last few weeks numbness and pain are worse, much worse.  Sx will run up to shoulder on both sides.  Thinks arm is getting weaker.  \"I dont have any strength in my hands\".   Sx are better during day and that is still the case.   Early in AM, however, cannot tie shoes as fingers feel numb.  This AM in office fingers feel numb but not nearly as bad as was in AM.  \"Pain at night is what is getting me\". Is not happening anywhere else on body.   Equal on both sides. Notes started on L but moved to R, but now the sx are equal.  Has some restriction with overhead reach due to 'tighteness' in shoulder.    Review of Systems   Constitution: Negative for chills, fever and malaise/fatigue.   Cardiovascular: Negative for near-syncope and syncope.   Skin: Negative for rash and suspicious lesions.   Musculoskeletal: Positive for muscle weakness (R arm is hard to get over head, \"tight\" ) and myalgias (B hands).   Genitourinary: Positive for frequency (long term, more than in past). Negative for bladder incontinence, dysuria, hematuria, hesitancy and incomplete emptying.   Neurological: Positive for focal weakness ( strength B, seems worse than in past) and numbness (B hands and into arms). Negative for dizziness, headaches and light-headedness.   Psychiatric/Behavioral: Negative for altered mental status.        Recalls oxycodone in past made him \"out of it\".         The following portions of the patient's history were reviewed and updated as appropriate: allergies, current medications, past medical history, past social history and problem list.      Current Outpatient Medications:   •  aspirin 81 MG EC tablet, Take 81 mg by mouth Daily., Disp: , Rfl:   •  Budesonide (ENTOCORT EC) 3 MG 24 hr " "capsule, Take one capsule by mouth daily as needed for loose stools, Disp: 30 capsule, Rfl: 4  •  calcium carbonate-vitamin d (CALTRATE 600+D) 600-400 MG-UNIT per tablet, Take  by mouth., Disp: , Rfl:   •  Cholecalciferol (VITAMIN D) 1000 UNITS tablet, Take  by mouth., Disp: , Rfl:   •  fluocinonide (LIDEX) 0.05 % external solution, APPLY TO SCALP D, Disp: , Rfl: 8  •  glucosamine sulfate 500 MG capsule capsule, Take  by mouth 3 (Three) Times a Day With Meals., Disp: , Rfl:   •  vitamin E 600 UNIT capsule, Take  by mouth., Disp: , Rfl:   •  Zinc 100 MG tablet, Take  by mouth., Disp: , Rfl:   •  traMADol (ULTRAM) 50 MG tablet, Take 1 tablet by mouth At Night As Needed for Moderate Pain ., Disp: 30 tablet, Rfl: 0    Vitals:    08/23/19 0805   BP: 126/74   Pulse: 82   Resp: 16   Temp: 98.2 °F (36.8 °C)   SpO2: 99%   Weight: 91.2 kg (201 lb)   Height: 193 cm (76\")         Physical Exam   Constitutional: He is oriented to person, place, and time. He appears well-developed and well-nourished.   HENT:   Head: Normocephalic and atraumatic.   Pulmonary/Chest: Effort normal. No respiratory distress.   Musculoskeletal: He exhibits edema (R >> L hand).        Right shoulder: He exhibits decreased range of motion (unable to full abduct arm due to \"tightness\". ).        Right hand: He exhibits normal range of motion, no tenderness and no bony tenderness. Decreased strength noted.        Left hand: He exhibits normal range of motion, no tenderness and no bony tenderness. Decreased strength noted.        Hands:  Neurological: He is alert and oriented to person, place, and time. He displays no atrophy and no tremor. No cranial nerve deficit. He exhibits normal muscle tone. Coordination (SIRENA intact but less fluid on L that R) and gait (slow, show gait, slight limp) abnormal.   Reflex Scores:       Tricep reflexes are 2+ on the right side and 2+ on the left side.       Brachioradialis reflexes are 2+ on the right side and 2+ on the " left side.       Patellar reflexes are 3+ on the right side and 3+ on the left side.  4/5  strength B  (-) Tinel's B     Skin: No rash noted.   Vitals reviewed.      Assessment/ Plan  Diagnoses and all orders for this visit:    Bilateral hand numbness  -     TAMIKO  -     CK  -     C-reactive Protein  -     Sedimentation Rate  -     Rheumatoid Factor, Quant  -     Antiphosphotidyl Antibodies Panel II  -     Anti-neutrophilic Cytoplasmic Antibody  -     traMADol (ULTRAM) 50 MG tablet; Take 1 tablet by mouth At Night As Needed for Moderate Pain .  -     MRI cervical spine w wo contrast; Future    Bilateral arm weakness  -     TAMIKO  -     CK  -     C-reactive Protein  -     Sedimentation Rate  -     Rheumatoid Factor, Quant  -     Antiphosphotidyl Antibodies Panel II  -     Anti-neutrophilic Cytoplasmic Antibody  -     MRI cervical spine w wo contrast; Future    Swelling of both hands  -     TAMIKO  -     CK  -     C-reactive Protein  -     Sedimentation Rate  -     Rheumatoid Factor, Quant  -     Antiphosphotidyl Antibodies Panel II  -     Anti-neutrophilic Cytoplasmic Antibody  -     MRI cervical spine w wo contrast; Future    History of traumatic brain injury  -     TAMIKO  -     CK  -     C-reactive Protein  -     Sedimentation Rate  -     Rheumatoid Factor, Quant  -     Antiphosphotidyl Antibodies Panel II  -     Anti-neutrophilic Cytoplasmic Antibody  -     MRI cervical spine w wo contrast; Future    Bilateral hand pain  -     traMADol (ULTRAM) 50 MG tablet; Take 1 tablet by mouth At Night As Needed for Moderate Pain .  -     MRI cervical spine w wo contrast; Future        Return for Next scheduled follow up.      Discussion:  Kadeem Reyes is a 68 y.o. male with hx of traumatic brain injury and recent dx of vernous insufficiency RTC in short interval f/u with rapid worsening of nocturnal B hand pain/ numbness new onset R hand swelling and weakness in .  All this is new over a period of weeks and note is made that pt  is new to me on transfer from Dr. Crowe.  While CTS was initial on Ddx, I am perplexed by progression of sx and do wonder about transverse myelitis as possibilty.  I did d/w DR. Almaraz in vascular today as she called worried about Parkinsons with his shuffling gait, though this (while possible in global sense) does not explain pt pain sx in hands or nocturnal predominance. Will move forward with labs as noted above and will get ASAP [facility].  EMG/ NCS is pending next week. Did give pt tramadol Rx to get him through weekend with nighttime pain. Chirag reviewed and consent completed in office today for this new Rx. Caution constipation and sedation. Can consider bracing in wrists as well.  Will f/u with pt after above returns. Call if any new sx.

## 2019-08-25 ENCOUNTER — HOSPITAL ENCOUNTER (OUTPATIENT)
Dept: MRI IMAGING | Facility: HOSPITAL | Age: 69
Discharge: HOME | End: 2019-08-25
Admitting: INTERNAL MEDICINE

## 2019-08-25 DIAGNOSIS — M79.89 SWELLING OF BOTH HANDS: ICD-10-CM

## 2019-08-25 DIAGNOSIS — M79.642 BILATERAL HAND PAIN: ICD-10-CM

## 2019-08-25 DIAGNOSIS — M79.641 BILATERAL HAND PAIN: ICD-10-CM

## 2019-08-25 DIAGNOSIS — Z87.820 HISTORY OF TRAUMATIC BRAIN INJURY: ICD-10-CM

## 2019-08-25 DIAGNOSIS — R20.0 BILATERAL HAND NUMBNESS: ICD-10-CM

## 2019-08-25 DIAGNOSIS — R29.898 BILATERAL ARM WEAKNESS: ICD-10-CM

## 2019-08-25 PROCEDURE — A9577 INJ MULTIHANCE: HCPCS | Performed by: INTERNAL MEDICINE

## 2019-08-25 PROCEDURE — 0 GADOBENATE DIMEGLUMINE 529 MG/ML SOLUTION: Performed by: INTERNAL MEDICINE

## 2019-08-25 PROCEDURE — 82565 ASSAY OF CREATININE: CPT

## 2019-08-25 PROCEDURE — 72156 MRI NECK SPINE W/O & W/DYE: CPT

## 2019-08-25 RX ADMIN — GADOBENATE DIMEGLUMINE 18 ML: 529 INJECTION, SOLUTION INTRAVENOUS at 07:58

## 2019-08-26 LAB — CREAT BLDA-MCNC: 0.9 MG/DL (ref 0.6–1.3)

## 2019-08-29 LAB
ANA SER QL: NEGATIVE
ANTI-PHOSPHATIDIC ACID: NORMAL
ANTI-PHOSPHATIDYL GLYCEROL: NORMAL
ANTI-PHOSPHATIDYL INOSITOL: NORMAL
ANTI-PHOSPHATIDYLETHANOLAMINE: NORMAL
C-ANCA TITR SER IF: NORMAL TITER
CK SERPL-CCNC: 55 U/L (ref 20–200)
CRP SERPL-MCNC: 3.28 MG/DL (ref 0–0.5)
ERYTHROCYTE [SEDIMENTATION RATE] IN BLOOD BY WESTERGREN METHOD: 16 MM/HR (ref 0–20)
P-ANCA ATYPICAL TITR SER IF: NORMAL TITER
P-ANCA TITR SER IF: NORMAL TITER
PE IGA SER-ACNC: 1.9 U/ML
PE IGG SER-ACNC: 3.2 U/ML
PE IGM SER-ACNC: 5.7 U/ML
PG IGA SER-ACNC: 2.7 U/ML
PG IGG SER-ACNC: 5.3 U/ML
PG IGM SER-ACNC: 1.1 U/ML
PHOSPHATIDATE IGA SER-ACNC: 4.9 U/ML
PHOSPHATIDATE IGG SER-ACNC: 3.7 U/ML
PHOSPHATIDATE IGM SER-ACNC: 2.2 U/ML
PI IGA SER-ACNC: 2.9 U/ML
PI IGG SER-ACNC: 3.6 U/ML
PI IGM SER-ACNC: 1.7 U/ML
RHEUMATOID FACT SERPL-ACNC: <10 IU/ML (ref 0–13.9)

## 2019-09-09 ENCOUNTER — HOSPITAL ENCOUNTER (OUTPATIENT)
Dept: INFUSION THERAPY | Facility: HOSPITAL | Age: 69
Discharge: HOME | End: 2019-09-09
Admitting: PSYCHIATRY & NEUROLOGY

## 2019-09-09 DIAGNOSIS — R20.2 BILATERAL ARM NUMBNESS AND TINGLING WHILE SLEEPING: ICD-10-CM

## 2019-09-09 DIAGNOSIS — R20.0 BILATERAL ARM NUMBNESS AND TINGLING WHILE SLEEPING: ICD-10-CM

## 2019-09-09 DIAGNOSIS — R20.2 NUMBNESS AND TINGLING IN LEFT HAND: ICD-10-CM

## 2019-09-09 DIAGNOSIS — R20.0 NUMBNESS AND TINGLING IN RIGHT HAND: ICD-10-CM

## 2019-09-09 DIAGNOSIS — R20.0 NUMBNESS AND TINGLING IN LEFT HAND: ICD-10-CM

## 2019-09-09 DIAGNOSIS — R20.2 NUMBNESS AND TINGLING IN RIGHT HAND: ICD-10-CM

## 2019-09-09 PROCEDURE — 95886 MUSC TEST DONE W/N TEST COMP: CPT

## 2019-09-09 PROCEDURE — 95911 NRV CNDJ TEST 9-10 STUDIES: CPT | Performed by: PSYCHIATRY & NEUROLOGY

## 2019-09-09 PROCEDURE — 95886 MUSC TEST DONE W/N TEST COMP: CPT | Performed by: PSYCHIATRY & NEUROLOGY

## 2019-09-09 PROCEDURE — 95911 NRV CNDJ TEST 9-10 STUDIES: CPT

## 2019-09-09 NOTE — PROCEDURES
EMG and Nerve Conduction Studies    I.      Instrument used: Neuromax 1002  II.     Please see data sheets for tabular summary of NCS and details on methods, temperatures and lab standards.   III.    EMG muscles tested for upper extremity studies include the deltoid, biceps, triceps, pronator teres, extensor digitorum communis, first dorsal interosseous and abductor pollicis brevis.    IV.   EMG muscles tested for lower extremity studies include the vastus lateralis, tibialis anterior, peroneus longus, medial gastrocnemius and extensor digitorum brevis.    V.    Additional muscles tested as needed.  Paraspinal muscles tested as needed.   VI.   Please see data sheets for tabular summary of EMG findings.   VII. The complete report includes the data sheets.      Indication: Carpal tunnel syndrome; bilateral shoulder pain  History: 68-year-old male with a 2-month history of numbness and tingling in his hands with pain which wakes him from sleep every night.  He also has bilateral shoulder pain.  He denies history of diabetes or thyroid trouble.  Some alcohol use history.      Ht: 193 cm  Wt: 91.2 kg; BMI 24.47  HbA1C:   Lab Results   Component Value Date    HGBA1C <4.30 (L) 10/15/2018     TSH:   Lab Results   Component Value Date    TSH 3.10 10/15/2018       Technical summary:  Nerve conduction studies were obtained in both arms.  Skin temperatures were at least 32 °C measured on the palms.  Needle examination was obtained on selected muscles in both arms.    Results:  1.  Absent median sensory potentials bilaterally.  2.  Prolonged ulnar sensory latencies bilaterally at 3.8 ms on the left and 3.7 ms on the right with low amplitudes of 13 µV on the left and 8.7 µV on the right.  3.  Normal right radial sensory distal latency with low amplitude of 9.7 µV.  4.  Absent median motor potentials from proximal and distal stimulation sites recorded over the abductor pollicis brevis.  There is a low amplitude waveform on palm  stimulation on the right but not on the left.  5.  Normal left ulnar motor conduction velocities below the elbow at 58.2 m/s and in the short segment across the elbow at 49.2 m/s.  The difference of 9 m/s is not significant.  Normal distal latency with low amplitude of 4.2 mV from wrist stimulation.  Normal right ulnar motor study.  6.  Needle examination of selected muscles in both arms showed profuse fibrillations and positive sharp waves in both abductor pollicis brevis muscles.  There was a single motor unit potential in the right abductor pollicis brevis with no motor units on the left.  There were occasional positive sharp waves in the left flexor pollicis longus and pronator teres with normal-appearing motor units and recruitment patterns.  The left triceps showed normal insertional activities with an increased number of large motor units increased firing rate and reduced interference pattern.  The right triceps was normal.  All other muscles tested showed normal insertional activities, motor units and recruitment patterns.  Cervical paraspinals showed no abnormality at C7    Impression:  Complex abnormal study.  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  There are profuse needle exam changes in the abductor pollicis brevis muscles bilaterally on needle exam with absent compound muscle action potentials on nerve conduction.  In addition both ulnar sensory studies and right radial sensory study were abnormal consistent with a more diffuse peripheral neuropathy.  There were some needle exam changes in the left triceps, pronator teres and flexor pollicis longus which could go along with a C7 radiculopathy but no needle exam changes were seen in the paraspinals to confirm root level involvement.  Clinical correlation is suggested.  Study results were discussed with the patient.    Jaya Savage M.D.              Dictated utilizing Dragon dictation.

## 2019-09-16 ENCOUNTER — TELEPHONE (OUTPATIENT)
Dept: NEUROLOGY | Facility: CLINIC | Age: 69
End: 2019-09-16

## 2019-09-16 NOTE — TELEPHONE ENCOUNTER
I spoke with Dr. Squires.  We reviewed his EMG.  It was complex.  There is likely a background peripheral neuropathy in this nondiabetic patient but the median motor studies were severely abnormal with complete conduction block and severe denervation in the abductor pollicis brevis muscles.  Some additional changes on the left suggested possible C7 radiculopathy.  The patient has had a cervical spine MRI but no clear evidence of a radiculopathy that would affect C7 or C8.    I suggested he be seen by a hand surgeon even though because of the severity of the median neuropathies he may not get much function back but it may help his hand pain.    GNS

## 2019-09-19 DIAGNOSIS — G56.03 BILATERAL CARPAL TUNNEL SYNDROME: ICD-10-CM

## 2019-09-19 DIAGNOSIS — G56.12 MEDIAN NEUROPATHY, LEFT: ICD-10-CM

## 2019-09-19 DIAGNOSIS — G56.11 MEDIAN NERVE DYSFUNCTION, RIGHT: ICD-10-CM

## 2019-09-19 DIAGNOSIS — R94.131 ABNORMAL EMG: Primary | ICD-10-CM

## 2019-10-03 RX ORDER — BUDESONIDE 3 MG/1
CAPSULE, COATED PELLETS ORAL
Qty: 30 CAPSULE | Refills: 4 | Status: SHIPPED | OUTPATIENT
Start: 2019-10-03 | End: 2020-10-12 | Stop reason: SDUPTHER

## 2019-10-03 NOTE — TELEPHONE ENCOUNTER
Escribe request received for budesonide 3 mg 1 tab po daily as needed, #30, R0.    See o/v note of 5/6/19, Escribe completed as requested, R4.    Update to DR Richmond.

## 2019-10-17 DIAGNOSIS — K52.839 MICROSCOPIC COLITIS, UNSPECIFIED MICROSCOPIC COLITIS TYPE: ICD-10-CM

## 2019-10-17 DIAGNOSIS — D75.89 MACROCYTOSIS WITHOUT ANEMIA: ICD-10-CM

## 2019-10-17 DIAGNOSIS — E87.1 HYPONATREMIA: ICD-10-CM

## 2019-10-17 DIAGNOSIS — Z00.00 HEALTHCARE MAINTENANCE: Primary | ICD-10-CM

## 2019-10-17 DIAGNOSIS — R79.89 ELEVATED TSH: ICD-10-CM

## 2019-10-22 LAB
ALBUMIN SERPL-MCNC: 4.4 G/DL (ref 3.5–5.2)
ALBUMIN/GLOB SERPL: 1.7 G/DL
ALP SERPL-CCNC: 80 U/L (ref 39–117)
ALT SERPL-CCNC: 9 U/L (ref 1–41)
APPEARANCE UR: CLEAR
AST SERPL-CCNC: 8 U/L (ref 1–40)
BACTERIA #/AREA URNS HPF: NORMAL /HPF
BASOPHILS # BLD AUTO: 0.02 10*3/MM3 (ref 0–0.2)
BASOPHILS NFR BLD AUTO: 0.4 % (ref 0–1.5)
BILIRUB SERPL-MCNC: 0.6 MG/DL (ref 0.2–1.2)
BILIRUB UR QL STRIP: NEGATIVE
BUN SERPL-MCNC: 15 MG/DL (ref 8–23)
BUN/CREAT SERPL: 16.5 (ref 7–25)
CALCIUM SERPL-MCNC: 9.4 MG/DL (ref 8.6–10.5)
CHLORIDE SERPL-SCNC: 95 MMOL/L (ref 98–107)
CHOLEST SERPL-MCNC: 155 MG/DL (ref 0–200)
CO2 SERPL-SCNC: 26.2 MMOL/L (ref 22–29)
COLOR UR: YELLOW
CREAT SERPL-MCNC: 0.91 MG/DL (ref 0.76–1.27)
EOSINOPHIL # BLD AUTO: 0.07 10*3/MM3 (ref 0–0.4)
EOSINOPHIL NFR BLD AUTO: 1.2 % (ref 0.3–6.2)
EPI CELLS #/AREA URNS HPF: NORMAL /HPF
ERYTHROCYTE [DISTWIDTH] IN BLOOD BY AUTOMATED COUNT: 13.3 % (ref 12.3–15.4)
GLOBULIN SER CALC-MCNC: 2.6 GM/DL
GLUCOSE SERPL-MCNC: 99 MG/DL (ref 65–99)
GLUCOSE UR QL: NEGATIVE
HCT VFR BLD AUTO: 38.6 % (ref 37.5–51)
HDLC SERPL-MCNC: 69 MG/DL (ref 40–60)
HGB BLD-MCNC: 13.5 G/DL (ref 13–17.7)
HGB UR QL STRIP: NEGATIVE
IMM GRANULOCYTES # BLD AUTO: 0.01 10*3/MM3 (ref 0–0.05)
IMM GRANULOCYTES NFR BLD AUTO: 0.2 % (ref 0–0.5)
KETONES UR QL STRIP: NEGATIVE
LDLC SERPL CALC-MCNC: 75 MG/DL (ref 0–100)
LEUKOCYTE ESTERASE UR QL STRIP: NEGATIVE
LYMPHOCYTES # BLD AUTO: 1.26 10*3/MM3 (ref 0.7–3.1)
LYMPHOCYTES NFR BLD AUTO: 22.3 % (ref 19.6–45.3)
MCH RBC QN AUTO: 32.8 PG (ref 26.6–33)
MCHC RBC AUTO-ENTMCNC: 35 G/DL (ref 31.5–35.7)
MCV RBC AUTO: 93.7 FL (ref 79–97)
MICRO URNS: NORMAL
MICRO URNS: NORMAL
MONOCYTES # BLD AUTO: 0.7 10*3/MM3 (ref 0.1–0.9)
MONOCYTES NFR BLD AUTO: 12.4 % (ref 5–12)
MUCOUS THREADS URNS QL MICRO: PRESENT /HPF
NEUTROPHILS # BLD AUTO: 3.6 10*3/MM3 (ref 1.7–7)
NEUTROPHILS NFR BLD AUTO: 63.5 % (ref 42.7–76)
NITRITE UR QL STRIP: NEGATIVE
NRBC BLD AUTO-RTO: 0 /100 WBC (ref 0–0.2)
PH UR STRIP: 7 [PH] (ref 5–7.5)
PLATELET # BLD AUTO: 219 10*3/MM3 (ref 140–450)
POTASSIUM SERPL-SCNC: 4.7 MMOL/L (ref 3.5–5.2)
PROT SERPL-MCNC: 7 G/DL (ref 6–8.5)
PROT UR QL STRIP: NEGATIVE
RBC # BLD AUTO: 4.12 10*6/MM3 (ref 4.14–5.8)
RBC #/AREA URNS HPF: NORMAL /HPF
SODIUM SERPL-SCNC: 131 MMOL/L (ref 136–145)
SP GR UR: 1.01 (ref 1–1.03)
TRIGL SERPL-MCNC: 56 MG/DL (ref 0–150)
TSH SERPL DL<=0.005 MIU/L-ACNC: 4.03 UIU/ML (ref 0.27–4.2)
URINALYSIS REFLEX: NORMAL
UROBILINOGEN UR STRIP-MCNC: 0.2 MG/DL (ref 0.2–1)
VLDLC SERPL CALC-MCNC: 11.2 MG/DL
WBC # BLD AUTO: 5.66 10*3/MM3 (ref 3.4–10.8)
WBC #/AREA URNS HPF: NORMAL /HPF

## 2019-10-28 ENCOUNTER — OFFICE VISIT (OUTPATIENT)
Dept: INTERNAL MEDICINE | Facility: CLINIC | Age: 69
End: 2019-10-28

## 2019-10-28 VITALS
SYSTOLIC BLOOD PRESSURE: 128 MMHG | BODY MASS INDEX: 23.62 KG/M2 | RESPIRATION RATE: 12 BRPM | WEIGHT: 194 LBS | DIASTOLIC BLOOD PRESSURE: 70 MMHG | HEART RATE: 67 BPM | OXYGEN SATURATION: 99 % | TEMPERATURE: 98.9 F | HEIGHT: 76 IN

## 2019-10-28 DIAGNOSIS — E87.1 HYPONATREMIA: ICD-10-CM

## 2019-10-28 DIAGNOSIS — G47.33 OSA (OBSTRUCTIVE SLEEP APNEA): ICD-10-CM

## 2019-10-28 DIAGNOSIS — L71.9 ROSACEA: ICD-10-CM

## 2019-10-28 DIAGNOSIS — N40.0 BENIGN PROSTATIC HYPERPLASIA WITHOUT LOWER URINARY TRACT SYMPTOMS: ICD-10-CM

## 2019-10-28 DIAGNOSIS — D75.89 MACROCYTOSIS WITHOUT ANEMIA: ICD-10-CM

## 2019-10-28 DIAGNOSIS — R79.82 ELEVATED C-REACTIVE PROTEIN (CRP): ICD-10-CM

## 2019-10-28 DIAGNOSIS — I87.2 VENOUS INSUFFICIENCY OF BOTH LOWER EXTREMITIES: ICD-10-CM

## 2019-10-28 DIAGNOSIS — G40.909 SEIZURE DISORDER (HCC): ICD-10-CM

## 2019-10-28 DIAGNOSIS — Z00.00 HEALTHCARE MAINTENANCE: Primary | ICD-10-CM

## 2019-10-28 DIAGNOSIS — R26.81 GAIT INSTABILITY: ICD-10-CM

## 2019-10-28 DIAGNOSIS — K52.839 MICROSCOPIC COLITIS, UNSPECIFIED MICROSCOPIC COLITIS TYPE: ICD-10-CM

## 2019-10-28 DIAGNOSIS — N42.89 PROSTATE ASYMMETRY: ICD-10-CM

## 2019-10-28 DIAGNOSIS — N52.8 OTHER MALE ERECTILE DYSFUNCTION: ICD-10-CM

## 2019-10-28 DIAGNOSIS — G56.03 CARPAL TUNNEL SYNDROME ON BOTH SIDES: ICD-10-CM

## 2019-10-28 PROCEDURE — 90653 IIV ADJUVANT VACCINE IM: CPT | Performed by: INTERNAL MEDICINE

## 2019-10-28 PROCEDURE — 99397 PER PM REEVAL EST PAT 65+ YR: CPT | Performed by: INTERNAL MEDICINE

## 2019-10-28 PROCEDURE — G0439 PPPS, SUBSEQ VISIT: HCPCS | Performed by: INTERNAL MEDICINE

## 2019-10-28 PROCEDURE — G0008 ADMIN INFLUENZA VIRUS VAC: HCPCS | Performed by: INTERNAL MEDICINE

## 2019-10-28 NOTE — PROGRESS NOTES
"Subjective     Chief Complaint   Patient presents with   • Annual Exam     review of medical issues/ loraine transfer       HPI:  Kadeem Reyes is a 69 y.o. male RTC in yearly AWV, review of medical issues:   1. CTS B - really profound presentation.  Has seen hand surgery and got braces and steroid injx. F/U is PRN if \"needs another shot\".  Notes can sleep on side again and pain is better at night.  Is able to make a fist now, could not prior. Recalls in hindsight that was doing a lot of physical work at daughters house prior to onset.  2. Varicose veins - notes that really struggled with some socks that got by Rx.  Unable to get on and off and really felt like swelling was still there.  Got some OTC versions and likes them better as can get them off and on better.  Is not considering surgical intervention at this time.  Notes pain is controlled with the socks.   3. Seizure Disorder - occurred after fell down the steps 16 years ago and was in coma for 5 weeks.  1 year later had some seizures that were grouped together and saw neurology. Placed on meds for > decade and been off meds now for > 1 year. No seizures since 6/2018 appt and had EEG after that appt.   4. 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 In coma for 5 weeks in ICU with Trach and PEG.  Was drunk at time of fall.  \"I did a lot of things I should not have done\".  No cognitive deficits but has some mild L sided weakness. Notes was drinking less after got out of hospital and memory \"got a lot better\".  Denies that really does not and did not have a problem with ETOH.  Thinks family would agree.   5. Microscopic colitis - dx'd in 2016 after C-scope with Dr. Richmond.  Notes 'it flares up sometimes and I take budesonide\".  Has diarrhea when flares.  Will take budesonide when flares and will take only a few pills and will resolve issue.  Coffee will trigger sx \"like crazy\". No other food triggers.   6. DANIEL - has CPAP, \"I just cant use it\".  Has machine " "for last 15 years and really has not used in years. \"I never really was on it\".  Thinks mask is the issue.  Has not seen sleep med in 'a pretty long time\".  \"I know I've got it... I am just wondering if there is a better machine for that\".  Is willing to see sleep med again. \"It has been a while now\".   7. Rosacea - not much of issue \"anymore\". NO meds used now.     The following portions of the patient's history were reviewed and updated as appropriate: allergies, current medications, past family history, past medical history, past social history, past surgical history and problem list.    Review of Systems   Constitution: Positive for malaise/fatigue (since quitworking out) and weight loss (since quit doing exercise and weights). Negative for chills, fever and weight gain.   HENT: Negative for congestion, hearing loss (R ear, baseline after fall), hoarse voice, odynophagia and sore throat.    Eyes: Negative for discharge, double vision, pain and redness.        Last eye exam ~2 months ago; wears glasses     Cardiovascular: Positive for leg swelling (controlled on compression socks). Negative for chest pain, dyspnea on exertion, irregular heartbeat, near-syncope, palpitations and syncope.   Respiratory: Positive for sleep disturbances due to breathing (has DANIEL). Negative for cough and shortness of breath.    Endocrine: Negative for polydipsia, polyphagia and polyuria.   Hematologic/Lymphatic: Negative for bleeding problem. Does not bruise/bleed easily.   Skin: Negative for rash and suspicious lesions.   Musculoskeletal: Positive for arthritis (in thumbs) and stiffness (\"My legs are stiff\".  Notes that gait is slow and limits him. ). Negative for falls, joint pain, joint swelling, muscle cramps, muscle weakness and myalgias.        Notes gait has been altered and stiff for about 5 years. Cannot walk for exercise now.  Has not seen PT. Vascular surgeon was really concerned about Parkinsons.    Gastrointestinal: " Negative for constipation, diarrhea, dysphagia, heartburn, nausea and vomiting.   Genitourinary: Negative for dysuria, frequency, hematuria and hesitancy.        Sees urology annually. Has had bx in past that was (-)   Neurological: Positive for focal weakness (baseline on L side and recent improved  weakness). Negative for dizziness, headaches, light-headedness, numbness and paresthesias.   Psychiatric/Behavioral: Negative for depression. The patient does not have insomnia and is not nervous/anxious.    Allergic/Immunologic: Negative for environmental allergies and persistent infections.       Patient Care Team:  Kadeem Squires MD as PCP - General (Internal Medicine)    Recent Hospitalizations: No recent hospitalization(s).    Depression Screen:   PHQ-2/PHQ-9 Depression Screening 10/28/2019   Little interest or pleasure in doing things 0   Feeling down, depressed, or hopeless 0   Trouble falling or staying asleep, or sleeping too much 0   Feeling tired or having little energy 0   Poor appetite or overeating 0   Feeling bad about yourself - or that you are a failure or have let yourself or your family down 0   Trouble concentrating on things, such as reading the newspaper or watching television 0   Moving or speaking so slowly that other people could have noticed. Or the opposite - being so fidgety or restless that you have been moving around a lot more than usual 0   Thoughts that you would be better off dead, or of hurting yourself in some way 0   Total Score 0   If you checked off any problems, how difficult have these problems made it for you to do your work, take care of things at home, or get along with other people? Not difficult at all       Functional and Cognitive Screening:  Functional & Cognitive Status 10/28/2019   Do you have difficulty preparing food and eating? No   Do you have difficulty bathing yourself, getting dressed or grooming yourself? No   Do you have difficulty using the toilet? No  "  Do you have difficulty moving around from place to place? No   Do you have trouble with steps or getting out of a bed or a chair? No   Current Diet Well Balanced Diet   Dental Exam Up to date   Eye Exam Up to date   Exercise (times per week) 0 times per week   Current Exercise Activities Include None   Do you need help using the phone?  No   Are you deaf or do you have serious difficulty hearing?  No   Do you need help with transportation? No   Do you need help shopping? No   Do you need help preparing meals?  No   Do you need help with housework?  No   Do you need help with laundry? No   Do you need help taking your medications? No   Do you need help managing money? No   Do you ever drive or ride in a car without wearing a seat belt? No   Have you felt unusual stress, anger or loneliness in the last month? No   Who do you live with? Spouse   If you need help, do you have trouble finding someone available to you? No   Have you been bothered in the last four weeks by sexual problems? No   Do you have difficulty concentrating, remembering or making decisions? No       Does the patient have evidence of cognitive impairment? no    Does not need ASA but is currently taking (advised patient that ASA is not indicated and patient chooses to stop it)    Vitals:    10/28/19 0856   BP: 128/70   Pulse: 67   Resp: 12   Temp: 98.9 °F (37.2 °C)   SpO2: 99%   Weight: 88 kg (194 lb)   Height: 193 cm (76\")   PainSc: 0-No pain       Body mass index is 23.61 kg/m².    Visual Acuity:  No exam data present    Advanced Care Planning:  Patient has an advance directive - a copy has not been provided. Have asked the patient to send this to us to add to record    Objective   Physical Exam   Constitutional: He is oriented to person, place, and time. He appears well-developed and well-nourished. He is cooperative. No distress.   HENT:   Head: Normocephalic and atraumatic.   Right Ear: Hearing, tympanic membrane, external ear and ear canal " normal.   Left Ear: Hearing, tympanic membrane, external ear and ear canal normal.   Nose: Nose normal.   Mouth/Throat: Uvula is midline, oropharynx is clear and moist and mucous membranes are normal. No oropharyngeal exudate.   Eyes: Conjunctivae, EOM and lids are normal. Pupils are equal, round, and reactive to light. Right eye exhibits no discharge. Left eye exhibits no discharge. Right conjunctiva is not injected. Right conjunctiva has no hemorrhage. Left conjunctiva is not injected. Left conjunctiva has no hemorrhage. No scleral icterus.   Normal eye blinking     Neck: Normal range of motion and full passive range of motion without pain. Neck supple. No tracheal tenderness (scar noted over trachea at old trach site) present. Carotid bruit is not present. No tracheal deviation present. No thyroid mass and no thyromegaly present.   Cardiovascular: Normal rate, regular rhythm, S1 normal, S2 normal, normal heart sounds and intact distal pulses. Exam reveals no gallop and no friction rub.   No murmur heard.  Pulses:       Radial pulses are 2+ on the right side, and 2+ on the left side.        Dorsalis pedis pulses are 2+ on the right side, and 2+ on the left side.        Posterior tibial pulses are 2+ on the right side, and 2+ on the left side.   Pulmonary/Chest: Effort normal and breath sounds normal. No respiratory distress. He has no wheezes. He has no rhonchi. He has no rales.   Abdominal: Soft. Bowel sounds are normal. He exhibits no distension and no mass. There is no hepatosplenomegaly. There is no tenderness. There is no rebound and no guarding.   Musculoskeletal: Normal range of motion. He exhibits no edema (compression socks on B).   Slight kyphosis       Vascular Status -  His right foot exhibits normal foot vasculature  and no edema. His left foot exhibits normal foot vasculature  and no edema.  Skin Integrity  -  His right foot skin is intact.His left foot skin is intact..  Lymphadenopathy:     He has  no cervical adenopathy.     He has no axillary adenopathy. No inguinal adenopathy noted on the right or left side.        Right: No inguinal adenopathy present.        Left: No inguinal adenopathy present.   Neurological: He is alert and oriented to person, place, and time. He has normal strength and normal reflexes. He displays no tremor (rest or action). No cranial nerve deficit or sensory deficit. He exhibits normal muscle tone. Gait (stiff gait, limp to L, slow; does not overtly shuffle) abnormal. Coordination (SIRENA intact B) normal.   Skin: Skin is warm, dry and intact. No rash noted.   Psychiatric: He has a normal mood and affect. His speech is normal and behavior is normal. Thought content normal. Cognition and memory are normal.   MMSE 29/30   Vitals reviewed.      Assessment/Plan   Problems Addressed this Visit     Gait instability    Relevant Orders    Ambulatory Referral to Physical Therapy Evaluate and treat; Full weight bearing    Hyponatremia    Microscopic colitis    Seizure disorder (CMS/HCC)    DANIEL (obstructive sleep apnea)    Relevant Orders    Ambulatory Referral to Sleep Medicine    Macrocytosis without anemia    Rosacea    Venous insufficiency of both lower extremities    Carpal tunnel syndrome on both sides    Relevant Orders    C-reactive Protein    Sedimentation Rate    Prostate asymmetry    Other male erectile dysfunction    RESOLVED: Benign prostatic hyperplasia without lower urinary tract symptoms      Other Visit Diagnoses     Healthcare maintenance    -  Primary    Relevant Orders    C-reactive Protein    Sedimentation Rate    Elevated C-reactive protein (CRP)        Relevant Orders    C-reactive Protein    Sedimentation Rate              Diagnoses and all orders for this visit:    Healthcare maintenance  -     C-reactive Protein  -     Sedimentation Rate    Venous insufficiency of both lower extremities    DANIEL (obstructive sleep apnea)  -     Ambulatory Referral to Sleep  Medicine    Microscopic colitis, unspecified microscopic colitis type    Seizure disorder (CMS/HCC)    Macrocytosis without anemia    Hyponatremia    Rosacea    Gait instability  -     Ambulatory Referral to Physical Therapy Evaluate and treat; Full weight bearing    Carpal tunnel syndrome on both sides  -     C-reactive Protein  -     Sedimentation Rate    Elevated C-reactive protein (CRP)  -     C-reactive Protein  -     Sedimentation Rate    Prostate asymmetry    Benign prostatic hyperplasia without lower urinary tract symptoms    Other male erectile dysfunction    Other orders  -     FluZone High Dose 65YR+ ([id number])        Kadeem Reyes is a 69 y.o. male RTC in yearly AWV, review of medical issues:   1. CTS B - really profound presentation, improved now s/p hand surgery eval with bracing and CT injx.  Recall pt is s/p EMG/ NCS that confirmed dx, though had some other equivocal abnormalities.  MRI C-spine that showed Mild to moderate foraminal narrowing at several levels, no transverse myelitis.  Will monitor pt progress for now as pain is markedly better today.   2. Varicose veins - new dx in 2019, s/p eval with vascular surgery and plans for long term compression. Pt notes improved edema and discomfort with compression.   3. Seizure Disorder - occurred after fell down the steps 16 years ago with TBI and coma x 5 weeks.  On meds for > decade. Reviewed 6/2018 neuro note with no noted seizures off 3 month med cessation trial and negative EEG. Off meds at this time, no seizures.   4. TBI - after fall down steps ~16 years ago while intoxicated. In ICU with trach and PEG at time.  Has made near full recovery with only noted mild L sided weakness long term.    5. Microscopic colitis - dx'd in 2016 after C-scope with Dr. Richmond.  Reviewed 2018 note with plan for PRN use of budesonide. Coffee major trigger.   6. DANIEL - intolerant to CPAP in distant past, but has not re-eval'd in years.  I d/w pt issues with untreated  DANIEL and pt agrees to see sleep med for re-eval. Referral placed.   7. Rosacea - LUCIA.   8. Stiff gait - very notable on exam, present for ~5 years per pt, unclear etiology. Vascular surgery very concerned about Parkinson's, but pt has no tremor, no lack of eye blinking, and really does NOT have a shuffling gait. Gait is stiff and pt limps on L.  I suspect this is really more arthritic and deconditioning related. We discussed neuro re-eval for Parkinson's r/o, but will hold until after pt sees PT on my referral today for gait training eval.  If pt fails to improve, will ask neuro to see.  Will consider X-rays of hips at f/u to eval for OA, but deferred today due to time and not changing plan of care today.   --> Mild CRP elevation - noted during recetn CTS work-up. Repeat today.   9. Hyponatremia - chronic since TBI, per pt.  Stable on labs. Monitor.   10. Macrocytosis without anemia - B12 OK in past.  Resolved on labs today. ETOH control needed.  Monitor.   11. BPH - minimal sx. Sees urology annually, reviewed 10/2019.  Hx of prostate asymmetry in 2011, s/p (-) bx.   12. HM - labs d/w pt; Fly/ Tdap/ Pvax/ Prevnar - UTD; Hep A and Shingrix - at pharmacy; C-scope 3/2012 (-) --> 10 years; MITCH/ PSA per urology;  Hep C Ab (-) 12/2016; Preventative care plan provided to pt at end of visit        Return in about 6 months (around 4/28/2020) for Recheck.          Current Outpatient Medications:   •  calcium carbonate-vitamin d (CALTRATE 600+D) 600-400 MG-UNIT per tablet, Take  by mouth., Disp: , Rfl:   •  glucosamine sulfate 500 MG capsule capsule, Take  by mouth 3 (Three) Times a Day With Meals., Disp: , Rfl:   •  Budesonide (ENTOCORT EC) 3 MG 24 hr capsule, TAKE ONE CAPSULE BY MOUTH EVERY DAY AS NEEDED, Disp: 30 capsule, Rfl: 4  •  Cholecalciferol (VITAMIN D) 1000 UNITS tablet, Take  by mouth., Disp: , Rfl:   •  traMADol (ULTRAM) 50 MG tablet, Take 1 tablet by mouth At Night As Needed for Moderate Pain ., Disp: 30 tablet,  Rfl: 0

## 2019-10-28 NOTE — PATIENT INSTRUCTIONS
Shingrix (new shingles shot; 2 shot series) check at pharmacy  Hepatitis A (2 shot series) check at pharmacy    Medicare Wellness  Personal Prevention Plan of Service     Date of Office Visit:  10/28/2019  Encounter Provider:  Kadeem Squires MD  Place of Service:  [facility]  Patient Name: Kadeem Reyes  :  1950    As part of the Medicare Wellness portion of your visit today, we are providing you with this personalized preventive plan of services (PPPS). This plan is based upon recommendations of the [place] Preventive Services Task Force (USPSTF) and the Advisory Committee on Immunization Practices (ACIP).    This lists the preventive care services that should be considered, and provides dates of when you are due. Items listed as completed are up-to-date and do not require any further intervention.    Health Maintenance   Topic Date Due   • ZOSTER VACCINE (1 of 2) 2001   • MEDICARE ANNUAL WELLNESS  10/28/2020   • TDAP/TD VACCINES (2 - Td) 2021   • COLONOSCOPY  2022   • HEPATITIS C SCREENING  Completed   • INFLUENZA VACCINE  Completed   • PNEUMOCOCCAL VACCINES (65+ LOW/MEDIUM RISK)  Completed       Orders Placed This Encounter   Procedures   • FluZone High Dose 65YR+ ([id number])   • C-reactive Protein   • Sedimentation Rate   • Ambulatory Referral to Sleep Medicine     Referral Priority:   Routine     Referral Type:   Consultation     Referral Reason:   Specialty Services Required     Requested Specialty:   Sleep Medicine     Number of Visits Requested:   1       No Follow-up on file.

## 2019-11-13 ENCOUNTER — TREATMENT (OUTPATIENT)
Dept: PHYSICAL THERAPY | Facility: CLINIC | Age: 69
End: 2019-11-13

## 2019-11-13 DIAGNOSIS — R26.9 ABNORMAL GAIT: ICD-10-CM

## 2019-11-13 DIAGNOSIS — S06.9X6S TRAUMATIC BRAIN INJURY, WITH LOSS OF CONSCIOUSNESS GREATER THAN 24 HOURS WITHOUT RETURN TO PRE-EXISTING CONSCIOUS LEVEL WITH PATIENT SURVIVING, SEQUELA (HCC): Primary | ICD-10-CM

## 2019-11-13 PROCEDURE — 97530 THERAPEUTIC ACTIVITIES: CPT | Performed by: PHYSICAL THERAPIST

## 2019-11-13 PROCEDURE — 97110 THERAPEUTIC EXERCISES: CPT | Performed by: PHYSICAL THERAPIST

## 2019-11-13 PROCEDURE — 97161 PT EVAL LOW COMPLEX 20 MIN: CPT | Performed by: PHYSICAL THERAPIST

## 2019-11-13 NOTE — PROGRESS NOTES
Physical Therapy Initial Evaluation and Plan of Care      Patient: Kadeem Reyes   : 1950  Diagnosis/ICD-10 Code:  Traumatic brain injury, with loss of consciousness greater than 24 hours without return to pre-existing conscious level with patient surviving, sequela (CMS/[facility]) [S06.9X6S]  Referring practitioner: Kadeem Squires MD  Date of Initial Visit: 2019  Today's Date: 2019  Patient seen for Visit count could not be calculated. Make sure you are using a visit which is associated with an episode. sessions           Subjective Questionnaire: ABC: 85%      Subjective Evaluation    History of Present Illness  Date of onset: 11/10/2019  Mechanism of injury: Pt reports continued weakness in the past year.  He had a TBI 16 years ago and was in a coma 5 weeks and had become I after rehab but recently his legs became weak and spasming.  He was diagnosed with a pinched nerve in the low back due to arthritis. He has had a couple of falls but caught himself. He has trouble getting out of cars and navigating stairs. He owns a cane but hasn't used. No pain. He has carpal tunnel and lymphedema and wears compression sleeves.     Subjective comment: leg weakness/balance deficitsQuality of life: good    Pain  Current pain ratin  At best pain ratin  At worst pain ratin  Aggravating factors: ambulation, squatting and stairs  Progression: worsening    Treatments  Current treatment: physical therapy  Patient Goals  Patient goals for therapy: increased strength, decreased pain, increased motion, improved balance, decreased edema and independence with ADLs/IADLs             Objective       Strength/Myotome Testing     Left Hip   Planes of Motion   Flexion: 4+  Extension: 4-  Abduction: 4+  External rotation: 4    Left Knee   Flexion: 4-  Extension: 4    Left Ankle/Foot   Dorsiflexion: 4+  Plantar flexion: 4    Additional Strength Details  Increased extensor tone in L LE limits strength testing but he is  weak in L hip ext and knee flex  R LE grossly 4+/5    Muscle Activation   Patient able to activate right transverse abdominals and right multifidus.   Patient unable to activate left transverse abdominals and left multifidus.     Ambulation     Quality of Movement During Gait     Additional Quality of Movement During Gait Details  Narrow BG, hypertonicity in hip flexors with L trendeleberg in stance    Functional Assessment   Squat   Trunk lean left and sitting toward right side.     Single Leg Stance   Left: 0 seconds  Right: 3 seconds    Comments  TUG 13.3 sec  Cronin 35/56         Assessment & Plan     Assessment  Impairments: abnormal gait, abnormal muscle firing, abnormal or restricted ROM, activity intolerance, impaired physical strength, lacks appropriate home exercise program, pain with function and weight-bearing intolerance  Assessment details: Pt presents with impaired gait and balance due to high tone from TBI.  He also has hx of lumbar nerve impingement per MD and this is the most likely contribution to his most recent decline as his tone has been present for years.  He has difficulty in L SLS and his Cronin score is moderate fall risk.  He has weakness in the L glute and HS most specifically but his L LE is grossly weaker than the R.  He would benefit from skilled PT in order to improve balance and gait to reduce fall risk.   Prognosis: good  Functional Limitations: walking and stooping  Goals  Plan Goals: SHORT TERM GOALS:  4 weeks       1. Pt independent with HEP without increase in pain  2. Pt to demonstrate improved balance with Cronin score by 4 pts for decreased fall risk  3. Pt to report being able to get in and out of cars with less difficulty    LONG TERM GOALS:   8 weeks  1. Pt to demonstrate ability to perform full functional squat with good form and control of the knees and without increasing pain  2. Pt to demonstrate R hip ext and knee flex strength to 4/5 or greater to improve safety with  ambulation on uneven surfaces  3. Pt will score a 10% improvement in ability on the ABC  4. Pt to demonstrate ability to navigate stairs reciprocally with no unsteadiness  5.  Pt to complete TUG in less than 11 sec    Plan  Therapy options: will be seen for skilled physical therapy services  Planned modality interventions: microcurrent electrical stimulation  Planned therapy interventions: manual therapy, neuromuscular re-education, soft tissue mobilization, strengthening, stretching, joint mobilization, home exercise program, gait training, flexibility, abdominal trunk stabilization, balance/weight-bearing training, body mechanics training, therapeutic activities and transfer training  Frequency: 2x week  Duration in weeks: 8  Treatment plan discussed with: patient        Manual Therapy:    0     mins  39501;  Therapeutic Exercise:    14     mins  15750;     Neuromuscular Rommel:    0    mins  [id number];    Therapeutic Activity:     17     mins  17381;     Gait Trainin     mins  [id number];     Ultrasound:     0     mins  68730;    Electrical Stimulation:    0     mins  38586 ( );  Dry Needling     0     mins self-pay    Timed Treatment:   31   mins   Total Treatment:     50   mins    PT SIGNATURE: Lexi Calhoun, PT   DATE TREATMENT INITIATED: 2019    Medicare Initial Certification  Certification Period: 2020  I certify that the therapy services are furnished while this patient is under my care.  The services outlined above are required by this patient, and will be reviewed every 90 days.     PHYSICIAN: Kadeem Squires MD      DATE:     Please sign and return via fax to [phone].. Thank you, [facility].

## 2019-11-14 ENCOUNTER — OFFICE VISIT (OUTPATIENT)
Dept: SLEEP MEDICINE | Facility: HOSPITAL | Age: 69
End: 2019-11-14

## 2019-11-14 VITALS
BODY MASS INDEX: 24.18 KG/M2 | HEIGHT: 76 IN | OXYGEN SATURATION: 99 % | WEIGHT: 198.6 LBS | SYSTOLIC BLOOD PRESSURE: 152 MMHG | DIASTOLIC BLOOD PRESSURE: 76 MMHG | HEART RATE: 73 BPM

## 2019-11-14 DIAGNOSIS — R06.83 SNORING: Primary | ICD-10-CM

## 2019-11-14 DIAGNOSIS — G40.909 SEIZURE DISORDER (HCC): ICD-10-CM

## 2019-11-14 DIAGNOSIS — R26.81 GAIT INSTABILITY: ICD-10-CM

## 2019-11-14 DIAGNOSIS — G47.33 OSA (OBSTRUCTIVE SLEEP APNEA): ICD-10-CM

## 2019-11-14 PROCEDURE — 99204 OFFICE O/P NEW MOD 45 MIN: CPT | Performed by: INTERNAL MEDICINE

## 2019-11-14 PROCEDURE — G0463 HOSPITAL OUTPT CLINIC VISIT: HCPCS

## 2019-11-14 NOTE — PROGRESS NOTES
[facility]  [address]  [address] [place] [address]  Phone   Fax       Kadeem Reyes  1950  69 y.o.  male      PCP:Kadeem Squires MD  Referring Provider same    Type of service: Initial consult  Date of service: 11/14/2019      Chief Complaint   Patient presents with   • Witnessed Apnea   • Snoring   • Daytime Sleepiness       History of present illness;  Thank you for asking to see Kadeem Reyes, 69 y.o.  for evaluation of sleep apnea. The patient was seen today on 11/14/2019 at [facility].   Patient reports that he had a sleep study about 15 years ago but has not used a CPAP.  He also has cognitive problem and also gait problem after his accident about 15 years ago and has a unsteady gait.  Reports that he has significant snoring and feels tired and exhausted.  He also has irregular breathing pattern.    Patient gives the following sleep history.  Sleep schedule:  Bedtime: 9 PM  Wake time: 6 AM  Normally takes about 15 minutes to fall asleep  Average hours of sleep 6-7  Number of naps per day 2 naps  When patient wakes up still feels tired and not rested  Snoring; yes  Witnessed apneas; yes  Have you ever awakened gasping for breath, coughing, choking: Yes      Past Medical History:   Diagnosis Date   • Benign prostatic hyperplasia without lower urinary tract symptoms 10/28/2019   • Carpal tunnel syndrome on both sides 10/28/2019    Profound presentation with pain,  weakness, and arm pain to shoulder in 2019; improved with injx and braces. S/P hand surgery eval with Dr. Sauceda.    • Gait instability    • History of gout    • Hyponatremia    • Intracranial hemorrhage (CMS/HCC)     after fall down steps in ~2003, coma x ~5 weeks   • DANIEL (obstructive sleep apnea) 9/8/2016    dx'd ~2004, non-compliant with CPAP   • Osteoarthritis     hands   • Other male erectile dysfunction 10/28/2019   • Peripheral neuropathy    • Seizure disorder  (CMS/[facility])    • Traumatic brain injury (CMS/[facility])     after fall in 2003       Social history:  Shift work: No  Tobacco use: No  Alcohol use: 2-3 a day  Caffeinated drinks: 2 to 3 cups of tea  Over-the-counter sleeping aid and medications: No  Narcotic medications: No    Family Hx  Family history of sleep apnea, not applicable  Family History   Problem Relation Age of Onset   • Kidney cancer Mother    • Diabetes Father    • Diabetes Sister    • Stroke Sister    • Scoliosis Brother    • No Known Problems Son    • No Known Problems Daughter    • Diabetes Sister    • Leukemia Sister    • No Known Problems Brother        Medications: reviewed    Current Outpatient Medications:   •  Budesonide (ENTOCORT EC) 3 MG 24 hr capsule, TAKE ONE CAPSULE BY MOUTH EVERY DAY AS NEEDED, Disp: 30 capsule, Rfl: 4  •  calcium carbonate-vitamin d (CALTRATE 600+D) 600-400 MG-UNIT per tablet, Take  by mouth., Disp: , Rfl:   •  Cholecalciferol (VITAMIN D) 1000 UNITS tablet, Take  by mouth., Disp: , Rfl:   •  glucosamine sulfate 500 MG capsule capsule, Take  by mouth 3 (Three) Times a Day With Meals., Disp: , Rfl:   •  traMADol (ULTRAM) 50 MG tablet, Take 1 tablet by mouth At Night As Needed for Moderate Pain ., Disp: 30 tablet, Rfl: 0    Review of systems:  [place] Sleepiness Scale: Total score: 5   Positive for snoring, witnessed apneas, fatigue and daytime excessive sleepiness,   Negative for shortness of breath, cough, wheezing, chest pain, nausea and vomiting, palpitation, swelling of feet:    Morning headaches: No  Awaken with sore throat or dry mouth : Yes  Leg jerking at night: No  Crawly feeling/urge sensation to move in the legs: No  Teeth grinding: No  Sleepwalking, nightmares, muscle weakness with laughing or anger,sleep paralysis: No  Nasal Congestion: No  Nocturia (how many times/night): No  Memory Problem: No  Change in weight, lost about 10 pounds    Physical exam:  Vitals:    11/14/19 0837   BP: 152/76   Pulse: 73   SpO2: 99%  "  Weight: 90.1 kg (198 lb 9.6 oz)   Height: 193 cm (76\")    Body mass index is 24.17 kg/m². Neck Circumference: 17 inches  HEENT: Head is atraumatic, normocephalic   Eyes:pupils are round equal and reacting to light and accommodation, conjunctiva normal  Nose:no nasal septal defects or deviation and the nasal passages are clear, no nasal polyps,   Throat: tonsils are not enlarged, tongue normal size, oral airway Mallampati class 3  NECK: No lymphadenopathy, trachea is in the midline, thyroid not enlarged  RESPIRATORY SYSTEM: Breath sounds are equal on both sides, there are no wheezes or crackles  CARDIOVASULAR SYSTEM: Heart sounds are regular rhythm and kathe rate, there are no murmurs or thrills  ABDOMEN: Soft, no hepatosplenomegaly, no evidence of ascites  EXTREMITES: No cyanosis, clubbing or edema   NEUROLOGICAL SYSTEM: Oriented x 3, no gross neurological defects, gait unsteady     Medical records and labs reviewed in [facility]     Assessment and plan:  · Sleep apnea unspecified, (G47.30) Patient's symptoms and examination is consistent with sleep apnea.  I have talked to the patient about the signs and symptoms of sleep apnea and consequences of untreated sleep apnea. Discussed sleep testing.  I have placed a order in epic for a in lab Split night sleep test.  Patient will have a follow-up after this sleep test is done.   · Snoring secondary to sleep apnea  · Hypersomnia with [place] Sleepiness Scale of Total score: 5 due to sleep apnea.  · History of traumatic brain injury  · History of seizures  · Unsteady gait        I once again thank you for asking me to see this patient in consultation and I have forwarded my opinion and treatment plan.  Please do not hesitate to call me if you have any questions.     Juan Carlos Erazo MD, [facility]  Sleep Medicine.(Board-certified)  [facility]  11/14/2019 ,    "

## 2019-11-21 ENCOUNTER — TREATMENT (OUTPATIENT)
Dept: PHYSICAL THERAPY | Facility: CLINIC | Age: 69
End: 2019-11-21

## 2019-11-21 DIAGNOSIS — S06.9X6S TRAUMATIC BRAIN INJURY, WITH LOSS OF CONSCIOUSNESS GREATER THAN 24 HOURS WITHOUT RETURN TO PRE-EXISTING CONSCIOUS LEVEL WITH PATIENT SURVIVING, SEQUELA (HCC): Primary | ICD-10-CM

## 2019-11-21 DIAGNOSIS — R26.9 ABNORMAL GAIT: ICD-10-CM

## 2019-11-21 PROCEDURE — 97530 THERAPEUTIC ACTIVITIES: CPT | Performed by: PHYSICAL THERAPIST

## 2019-11-21 PROCEDURE — 97110 THERAPEUTIC EXERCISES: CPT | Performed by: PHYSICAL THERAPIST

## 2019-11-21 PROCEDURE — 97140 MANUAL THERAPY 1/> REGIONS: CPT | Performed by: PHYSICAL THERAPIST

## 2019-11-21 NOTE — PROGRESS NOTES
Physical Therapy Daily Progress Note      Patient: Kadeem Reyes   : 1950  Referring practitioner: Kadeem Squires MD  Date of Initial Visit: Type: THERAPY  Noted: 2019  Today's Date: 2019  Patient seen for 2 sessions               Subjective Pt states he has been doing the exercises.     Objective   See Exercise, Manual, and Modality Logs for complete treatment.       Assessment/Plan Pt is exhibiting a lot of hypertonicity with walking, piriformis very tight B.  Tolerated the new exercises well and band added to hip abd and ext to progress strength.     Visit Diagnoses:    ICD-10-CM ICD-9-CM   1. Traumatic brain injury, with loss of consciousness greater than 24 hours without return to pre-existing conscious level with patient surviving, sequela (CMS/[facility]) S06.9X6S 907.0   2. Abnormal gait R26.9 781.2              Timed:  Manual Therapy:    12     mins  05799;  Therapeutic Exercise:    16     mins  41828;     Neuromuscular Rommel:    0    mins  27235;    Therapeutic Activity:     12     mins  00896;     Gait Trainin     mins  45880;     Ultrasound:     0     mins  48289;    Electrical Stimulation:    0     mins  42009 ( );    Untimed:  Electrical Stimulation:    0     mins  33298 ( );  Mechanical Traction:    0     mins  53662;     Timed Treatment:   40   mins   Total Treatment:     40   mins  Lexi Calhoun, PT  Physical Therapist

## 2019-12-02 ENCOUNTER — TREATMENT (OUTPATIENT)
Dept: PHYSICAL THERAPY | Facility: CLINIC | Age: 69
End: 2019-12-02

## 2019-12-02 DIAGNOSIS — R26.9 ABNORMAL GAIT: ICD-10-CM

## 2019-12-02 DIAGNOSIS — S06.9X6S TRAUMATIC BRAIN INJURY, WITH LOSS OF CONSCIOUSNESS GREATER THAN 24 HOURS WITHOUT RETURN TO PRE-EXISTING CONSCIOUS LEVEL WITH PATIENT SURVIVING, SEQUELA (HCC): Primary | ICD-10-CM

## 2019-12-02 PROCEDURE — 97140 MANUAL THERAPY 1/> REGIONS: CPT | Performed by: PHYSICAL THERAPIST

## 2019-12-02 PROCEDURE — 97530 THERAPEUTIC ACTIVITIES: CPT | Performed by: PHYSICAL THERAPIST

## 2019-12-02 PROCEDURE — 97110 THERAPEUTIC EXERCISES: CPT | Performed by: PHYSICAL THERAPIST

## 2019-12-02 NOTE — PROGRESS NOTES
Physical Therapy Daily Progress Note      Kadeem Reyes reports: felt that the manual stretching helped the most last visit, felt much looser than normal.  States that he can tell he is tight in his lower legs.    Subjective     Objective   See Exercise, Manual, and Modality Logs for complete treatment.   Exercise rationale/ pain free exercise performance  Anatomy and structure of affected musculature  Posture/Postural awareness  Lumbar support  Sleeping positions with pillows  Alternate exercise positions  Verbal/Tactile cues to ensure correct exercise performance/technique/positioning            Assessment/Plan  Compliant/Cooperative with rehab efforts.  Subjectively reports no increased symptoms or discomfort with therapeutic exercise today.  Able to perform increased exercise/functional activity without increased LOB.  Benefits from verbal/tactile cues to ensure proper exercise technique, performance and positioning.  Positive response to manual intervention/stretching techniques.        Progress strengthening /stabilization /functional activity as able           Manual Therapy:  15       mins  97542;  Therapeutic Exercise:   20      mins  77487;     Neuromuscular Rommel:        mins  72425;    Therapeutic Activity:     12     mins  83401;     Gait Training:           mins  37613;     Ultrasound:          mins  13897;    Electrical Stimulation:         mins  43410 ( );      Timed Treatment:  47    mins   Total Treatment:    47    mins    Roberth Castellon PTA    Physical Therapist Assistant [place] [id number]

## 2019-12-05 ENCOUNTER — TREATMENT (OUTPATIENT)
Dept: PHYSICAL THERAPY | Facility: CLINIC | Age: 69
End: 2019-12-05

## 2019-12-05 DIAGNOSIS — S06.9X6S TRAUMATIC BRAIN INJURY, WITH LOSS OF CONSCIOUSNESS GREATER THAN 24 HOURS WITHOUT RETURN TO PRE-EXISTING CONSCIOUS LEVEL WITH PATIENT SURVIVING, SEQUELA (HCC): ICD-10-CM

## 2019-12-05 DIAGNOSIS — R26.9 ABNORMAL GAIT: Primary | ICD-10-CM

## 2019-12-05 PROCEDURE — G0283 ELEC STIM OTHER THAN WOUND: HCPCS | Performed by: PHYSICAL THERAPIST

## 2019-12-05 PROCEDURE — 97140 MANUAL THERAPY 1/> REGIONS: CPT | Performed by: PHYSICAL THERAPIST

## 2019-12-05 PROCEDURE — 97530 THERAPEUTIC ACTIVITIES: CPT | Performed by: PHYSICAL THERAPIST

## 2019-12-05 NOTE — PROGRESS NOTES
Physical Therapy Daily Progress Note      Patient: Kadeem Reyes   : 1950  Referring practitioner: Kadeem Squires MD  Date of Initial Visit: Type: THERAPY  Noted: 2019  Today's Date: 2019  Patient seen for 4 sessions               Subjective Pt states he feels better after doing the stretches and exercises but hasn't noticed an increase in strength    Objective   See Exercise, Manual, and Modality Logs for complete treatment.       Assessment/Plan IFC added today to see if tone could be reduced by local muscle fatigue.  Pt did not report any noticeable change after the IFC.  Several new home stretches added.     Visit Diagnoses:    ICD-10-CM ICD-9-CM   1. Abnormal gait R26.9 781.2   2. Traumatic brain injury, with loss of consciousness greater than 24 hours without return to pre-existing conscious level with patient surviving, sequela (CMS/[facility]) S06.9X6S 907.0                Timed:  Manual Therapy:    14     mins  93228;  Therapeutic Exercise:    0     mins  61777;     Neuromuscular Rommel:    0    mins  08393;    Therapeutic Activity:     18     mins  51854;     Gait Trainin     mins  21806;     Ultrasound:     0     mins  26766;    Electrical Stimulation:    15     mins  10112 ( );    Untimed:  Electrical Stimulation:    15     mins  63000 ( );  Mechanical Traction:    0     mins  27827;     Timed Treatment:   32   mins   Total Treatment:     47   mins  Lexi Calhoun, PT  Physical Therapist

## 2019-12-10 ENCOUNTER — TREATMENT (OUTPATIENT)
Dept: PHYSICAL THERAPY | Facility: CLINIC | Age: 69
End: 2019-12-10

## 2019-12-10 DIAGNOSIS — R26.9 ABNORMAL GAIT: Primary | ICD-10-CM

## 2019-12-10 DIAGNOSIS — S06.9X6S TRAUMATIC BRAIN INJURY, WITH LOSS OF CONSCIOUSNESS GREATER THAN 24 HOURS WITHOUT RETURN TO PRE-EXISTING CONSCIOUS LEVEL WITH PATIENT SURVIVING, SEQUELA (HCC): ICD-10-CM

## 2019-12-10 PROCEDURE — 97530 THERAPEUTIC ACTIVITIES: CPT | Performed by: PHYSICAL THERAPIST

## 2019-12-10 PROCEDURE — 97140 MANUAL THERAPY 1/> REGIONS: CPT | Performed by: PHYSICAL THERAPIST

## 2019-12-10 NOTE — PROGRESS NOTES
Physical Therapy Daily Progress Note      Patient: Kadeem Reyes   : 1950  Referring practitioner: Kadeem Squires MD  Date of Initial Visit: Type: THERAPY  Noted: 2019  Today's Date: 12/10/2019  Patient seen for 5 sessions               Subjective Pt states he thinks the stretching helps but only temporarily.      Objective   See Exercise, Manual, and Modality Logs for complete treatment.   L piriformis is still extremely tight (less than 20 degrees in supine 90/90)    Assessment/Plan Pt tolerated prone quad stretch and will complete at home with help from family.  Pt had relief of his stiffness after PT with emphasis on stretching today.     Visit Diagnoses:    ICD-10-CM ICD-9-CM   1. Abnormal gait R26.9 781.2   2. Traumatic brain injury, with loss of consciousness greater than 24 hours without return to pre-existing conscious level with patient surviving, sequela (CMS/[facility]) S06.9X6S 907.0                Timed:  Manual Therapy:    26     mins  77557;  Therapeutic Exercise:    0     mins  37718;     Neuromuscular Rommel:    0    mins  80342;    Therapeutic Activity:     18     mins  34938;     Gait Trainin     mins  64536;     Ultrasound:     0     mins  66685;    Electrical Stimulation:    0     mins  37794 ( );    Untimed:  Electrical Stimulation:    0     mins  21346 ( );  Mechanical Traction:    0     mins  35737;     Timed Treatment:   44   mins   Total Treatment:     44   mins  Lexi Calhoun, PT  Physical Therapist

## 2019-12-12 ENCOUNTER — TREATMENT (OUTPATIENT)
Dept: PHYSICAL THERAPY | Facility: CLINIC | Age: 69
End: 2019-12-12

## 2019-12-12 DIAGNOSIS — S06.9X6S TRAUMATIC BRAIN INJURY, WITH LOSS OF CONSCIOUSNESS GREATER THAN 24 HOURS WITHOUT RETURN TO PRE-EXISTING CONSCIOUS LEVEL WITH PATIENT SURVIVING, SEQUELA (HCC): ICD-10-CM

## 2019-12-12 DIAGNOSIS — R26.9 ABNORMAL GAIT: Primary | ICD-10-CM

## 2019-12-12 PROCEDURE — 97530 THERAPEUTIC ACTIVITIES: CPT | Performed by: PHYSICAL THERAPIST

## 2019-12-12 PROCEDURE — 97140 MANUAL THERAPY 1/> REGIONS: CPT | Performed by: PHYSICAL THERAPIST

## 2019-12-12 PROCEDURE — 97110 THERAPEUTIC EXERCISES: CPT | Performed by: PHYSICAL THERAPIST

## 2019-12-12 NOTE — PROGRESS NOTES
Physical Therapy Daily Progress Note      Patient: Kadeem Reyes   : 1950  Referring practitioner: Kadeem Squires MD  Date of Initial Visit: Type: THERAPY  Noted: 2019  Today's Date: 2019  Patient seen for 6 sessions             Subjective Pt states he feels less tight the past couple of days and more limber. States sit to stand from low chairs is easier    Objective   See Exercise, Manual, and Modality Logs for complete treatment.       Assessment/Plan Pt is responding well to passive and contract relax stretching and sit to stand is getting easier for the pt    Visit Diagnoses:    ICD-10-CM ICD-9-CM   1. Abnormal gait R26.9 781.2   2. Traumatic brain injury, with loss of consciousness greater than 24 hours without return to pre-existing conscious level with patient surviving, sequela (CMS/[facility]) S06.9X6S 907.0                  Timed:  Manual Therapy:    17     mins  60544;  Therapeutic Exercise:    14     mins  13783;     Neuromuscular Rommel:    0    mins  42960;    Therapeutic Activity:     13     mins  00036;     Gait Trainin     mins  95853;     Ultrasound:     0     mins  97747;    Electrical Stimulation:    0     mins  21980 ( );    Untimed:  Electrical Stimulation:    0     mins  25968 ( );  Mechanical Traction:    0     mins  68395;     Timed Treatment:   44   mins   Total Treatment:     44   mins  Lexi Calhoun, PT  Physical Therapist

## 2020-01-28 ENCOUNTER — HOSPITAL ENCOUNTER (OUTPATIENT)
Dept: SLEEP MEDICINE | Facility: HOSPITAL | Age: 70
Discharge: HOME | End: 2020-01-28
Admitting: INTERNAL MEDICINE

## 2020-01-28 DIAGNOSIS — R26.81 GAIT INSTABILITY: ICD-10-CM

## 2020-01-28 DIAGNOSIS — R06.83 SNORING: ICD-10-CM

## 2020-01-28 DIAGNOSIS — G40.909 SEIZURE DISORDER (HCC): ICD-10-CM

## 2020-01-28 DIAGNOSIS — G47.33 OSA (OBSTRUCTIVE SLEEP APNEA): ICD-10-CM

## 2020-01-28 PROCEDURE — 95811 POLYSOM 6/>YRS CPAP 4/> PARM: CPT

## 2020-01-28 PROCEDURE — 95811 POLYSOM 6/>YRS CPAP 4/> PARM: CPT | Performed by: INTERNAL MEDICINE

## 2020-02-14 ENCOUNTER — TELEPHONE (OUTPATIENT)
Dept: SLEEP MEDICINE | Facility: HOSPITAL | Age: 70
End: 2020-02-14

## 2020-02-14 NOTE — TELEPHONE ENCOUNTER
Called pt with results of sleep study and faxed orders to [facility] on 2/14/2020.  F/u appt scheduled on 4/16/2020 @ 11:30 am with Dr. Erazo.  CV

## 2020-02-20 ENCOUNTER — TELEPHONE (OUTPATIENT)
Dept: SLEEP MEDICINE | Facility: HOSPITAL | Age: 70
End: 2020-02-20

## 2020-04-06 ENCOUNTER — DOCUMENTATION (OUTPATIENT)
Dept: PHYSICAL THERAPY | Facility: CLINIC | Age: 70
End: 2020-04-06

## 2020-04-06 DIAGNOSIS — S06.9X6S TRAUMATIC BRAIN INJURY, WITH LOSS OF CONSCIOUSNESS GREATER THAN 24 HOURS WITHOUT RETURN TO PRE-EXISTING CONSCIOUS LEVEL WITH PATIENT SURVIVING, SEQUELA (HCC): ICD-10-CM

## 2020-04-06 DIAGNOSIS — R26.9 ABNORMAL GAIT: Primary | ICD-10-CM

## 2020-04-06 NOTE — PROGRESS NOTES
Discharge Summary  Discharge Summary from Physical Therapy Report    Patient Information  Kadeem Reyes  1950    Dates  PT visit: 11/13/19-12/12/19  Number of Visits: 6    Pt did not return after 12/12/19 so discharge measures/re-assessment measures not taken.    Goals: Partially Met    Visit Diagnoses:    ICD-10-CM ICD-9-CM   1. Abnormal gait R26.9 781.2   2. Traumatic brain injury, with loss of consciousness greater than 24 hours without return to pre-existing conscious level with patient surviving, sequela (CMS/[facility]) S06.9X6S 907.0         Comments pt did report subjectively that he was improved as far as his strength and that the stretches had made him feel more limber.     Date of Discharge 12/12/19        Lexi Calhoun, PT  Physical Therapist

## 2020-04-16 ENCOUNTER — APPOINTMENT (OUTPATIENT)
Dept: SLEEP MEDICINE | Facility: HOSPITAL | Age: 70
End: 2020-04-16

## 2020-06-11 ENCOUNTER — OFFICE VISIT (OUTPATIENT)
Dept: SLEEP MEDICINE | Facility: HOSPITAL | Age: 70
End: 2020-06-11

## 2020-06-11 VITALS
HEIGHT: 76 IN | SYSTOLIC BLOOD PRESSURE: 130 MMHG | WEIGHT: 211.4 LBS | BODY MASS INDEX: 25.74 KG/M2 | OXYGEN SATURATION: 98 % | HEART RATE: 69 BPM | DIASTOLIC BLOOD PRESSURE: 65 MMHG

## 2020-06-11 DIAGNOSIS — Z99.89 OSA ON CPAP: Primary | ICD-10-CM

## 2020-06-11 DIAGNOSIS — G47.33 OSA ON CPAP: Primary | ICD-10-CM

## 2020-06-11 PROBLEM — R06.83 SNORING: Status: RESOLVED | Noted: 2019-11-14 | Resolved: 2020-06-11

## 2020-06-11 PROCEDURE — G0463 HOSPITAL OUTPT CLINIC VISIT: HCPCS

## 2020-06-11 PROCEDURE — 99213 OFFICE O/P EST LOW 20 MIN: CPT | Performed by: INTERNAL MEDICINE

## 2020-06-11 NOTE — PROGRESS NOTES
[facility]  [address]  [address] [place] [address]  Phone   Fax       SLEEP CLINIC FOLLOW UP PROGRESS NOTE.    Kadeem Reyes  1950  69 y.o.  male      PCP: Kadeem Squires MD      Date of visit: 6/11/2020    Chief Complaint   Patient presents with   • Sleep Apnea   • Follow-up       INTERM HISTORY:  This is a 69 y.o. years old patient who has a history of sleep apnea and is on CPAP.  Patient reports good compliance with the device.  Patient is using CPAP for the past 1 month and feels much better.  He says that this new machine is much better than the old one which you did not tolerate.    Sleep schedule  Normally goes to bed at 9 PM  Wakes up at 6 AM  Feels refreshed after waking up: Yes      The Smart card downloaded on 6/11/2020 has been reviewed by me and shows the following..  Compliance; 93 %  > 4 hr use, 93 %  Average use of the device 6 hours and 12 minutes per night  Residual AHI: 5.2 /hr (normal less than 5)  Mask type: Nasal mask  DME: [facility]      Past Medical History:   Diagnosis Date   • Benign prostatic hyperplasia without lower urinary tract symptoms 10/28/2019   • Carpal tunnel syndrome on both sides 10/28/2019    Profound presentation with pain,  weakness, and arm pain to shoulder in 2019; improved with injx and braces. S/P hand surgery eval with Dr. Sauceda.    • Gait instability    • History of gout    • Hyponatremia    • Intracranial hemorrhage (CMS/HCC)     after fall down steps in ~2003, coma x ~5 weeks   • DANIEL on CPAP     AHI 49/h   • Osteoarthritis     hands   • Other male erectile dysfunction 10/28/2019   • Peripheral neuropathy    • Seizure disorder (CMS/HCC)    • Traumatic brain injury (CMS/HCC)     after fall in 2003       MEDICATIONS: reviewed by me    Current Outpatient Medications:   •  Budesonide (ENTOCORT EC) 3 MG 24 hr capsule, TAKE ONE CAPSULE BY MOUTH EVERY DAY AS NEEDED, Disp: 30 capsule, Rfl: 4  •  calcium  "carbonate-vitamin d (CALTRATE 600+D) 600-400 MG-UNIT per tablet, Take  by mouth., Disp: , Rfl:   •  Cholecalciferol (VITAMIN D) 1000 UNITS tablet, Take  by mouth., Disp: , Rfl:   •  glucosamine sulfate 500 MG capsule capsule, Take  by mouth 3 (Three) Times a Day With Meals., Disp: , Rfl:   •  traMADol (ULTRAM) 50 MG tablet, Take 1 tablet by mouth At Night As Needed for Moderate Pain ., Disp: 30 tablet, Rfl: 0    Allergies   Allergen Reactions   • Phenytoin     reviewed by me    SOCIAL, FAMILY HISTORY: Medical records are reviewed and noted by me.  History of smoking no  History of alcohol use 10/week  Caffeine use 2    REVIEW OF SYSTEMS:   [place] Sleepiness Scale :Total score: 5   Snoring: Resolved  Morning headache: No  Nasal congestion: No  Leg movements: No  Number of times you wake up once asleep: 1  Heart burn no    PHYSICAL EXAMINATION:  Vitals:    06/11/20 0900   BP: 130/65   BP Location: Left arm   Patient Position: Sitting   Pulse: 69   SpO2: 98%   Weight: 95.9 kg (211 lb 6.4 oz)   Height: 193 cm (76\")    Body mass index is 25.73 kg/m².    HEENT: pupils are round equal and reacting to light and accommodation, nasal passage is clear, no nasal polyps, no lymphadenopathy, throat is clear, oral airway Mallampati class 3  RESPRATORY SYSTEM: Breath sounds are equal on both sides and are normal, no wheezes or crackles  CARDIOVASULAR SYSTEM: Heart rate is regular without murmur  ABDOMEN: Soft, no ascites, no hepatosplenomegaly.  EXTREMITIES: No cyanosis, clubbing or edema       ASSESSMENT AND PLAN:  · Obstructive sleep apnea, patient is using the device with good compliance for treatment of sleep apnea.  I have reviewed the smart card down load and discussed with the patient the download data and encouarged the patient to continue to use the device.  The symptoms have improved and the residual AHI is acceptable.  The device is benefiting the patient and the device is medically necessary. The patient is also " instructed to get the supplies from the DME company and a prescription for supplies has been sent to the DME company.    · Return to clinic in 12 months for follow-up        Juan Carlos Erazo MD, [facility]  Sleep Medicine.(Board-certified)  [facility]  Medical Director for [facility]  6/11/2020

## 2020-10-06 RX ORDER — BUDESONIDE 3 MG/1
CAPSULE, COATED PELLETS ORAL
Qty: 30 CAPSULE | Refills: 4 | OUTPATIENT
Start: 2020-10-06

## 2020-10-12 ENCOUNTER — TELEPHONE (OUTPATIENT)
Dept: GASTROENTEROLOGY | Facility: CLINIC | Age: 70
End: 2020-10-12

## 2020-10-12 RX ORDER — BUDESONIDE 3 MG/1
CAPSULE, COATED PELLETS ORAL
Qty: 30 CAPSULE | Refills: 11 | Status: SHIPPED | OUTPATIENT
Start: 2020-10-12 | End: 2020-10-29 | Stop reason: SDUPTHER

## 2020-10-12 NOTE — TELEPHONE ENCOUNTER
Pt last seen 5/6/19 (see note).   Upcoming appt with DR Richmond 10/29/20.     Escribe initiated for budesonide 3 mg - take one capsule by mouth every day as needed, #30, R0.   Message to Dr Richmond.

## 2020-10-12 NOTE — TELEPHONE ENCOUNTER
----- Message from Iris Thornton sent at 10/12/2020  8:52 AM EDT -----  Regarding: Budesonide (ENTOCORT EC) 3 MG  Contact: [phone]  Pt needs prescription called in.      [id number] - [place], [place] - [address] - [phone]  - [phone]  Phone:  [phone]  Fax:  [phone]

## 2020-10-21 DIAGNOSIS — E78.89 LIPIDS ABNORMAL: ICD-10-CM

## 2020-10-21 DIAGNOSIS — D75.89 MACROCYTOSIS WITHOUT ANEMIA: ICD-10-CM

## 2020-10-21 DIAGNOSIS — G40.909 SEIZURE DISORDER (HCC): ICD-10-CM

## 2020-10-21 DIAGNOSIS — Z00.00 HEALTHCARE MAINTENANCE: Primary | ICD-10-CM

## 2020-10-21 DIAGNOSIS — E87.1 HYPONATREMIA: ICD-10-CM

## 2020-10-29 ENCOUNTER — OFFICE VISIT (OUTPATIENT)
Dept: GASTROENTEROLOGY | Facility: CLINIC | Age: 70
End: 2020-10-29

## 2020-10-29 VITALS — TEMPERATURE: 99.3 F | WEIGHT: 210 LBS | HEIGHT: 76 IN | BODY MASS INDEX: 25.57 KG/M2

## 2020-10-29 DIAGNOSIS — K52.839 MICROSCOPIC COLITIS, UNSPECIFIED MICROSCOPIC COLITIS TYPE: Primary | ICD-10-CM

## 2020-10-29 PROCEDURE — 99213 OFFICE O/P EST LOW 20 MIN: CPT | Performed by: INTERNAL MEDICINE

## 2020-10-29 RX ORDER — BUDESONIDE 3 MG/1
CAPSULE, COATED PELLETS ORAL
Qty: 30 CAPSULE | Refills: 11 | Status: SHIPPED | OUTPATIENT
Start: 2020-10-29 | End: 2021-11-11

## 2020-10-29 NOTE — PROGRESS NOTES
Chief Complaint   Patient presents with   • Ulcerative Colitis        Kadeem Reyes is a  70 y.o. male here for a follow up visit for microscopic colitis    HPI this 70-year-old white male patient of Dr. Kadeem Squires presents since last seen in May 2019.  He has a history of microscopic colitis that was defined by colonoscopic examination performed by Dr. Tona Sweet in March 2012.  Biopsies were obtained although the colonoscopy was essentially negative.  The biopsies showed mild increase of mononuclear cells consistent with microscopic colitis.  He has manage this with budesonide and is currently taking 3 mg daily.  We talked about adjusting the dose down to avoid long-term side effects of the corticosteroid.  I also encouraged him to consider follow-up colonoscopy within the next year or so to update his colitis.  He will adjust the dose and call with a progress report in 2 to 3 months.  Anticipate a follow-up in the office in 1 year.    Past Medical History:   Diagnosis Date   • Benign prostatic hyperplasia without lower urinary tract symptoms 10/28/2019   • Carpal tunnel syndrome on both sides 10/28/2019    Profound presentation with pain,  weakness, and arm pain to shoulder in 2019; improved with injx and braces. S/P hand surgery eval with Dr. Sauceda.    • Gait instability    • History of gout    • Hyponatremia    • Intracranial hemorrhage (CMS/HCC)     after fall down steps in ~2003, coma x ~5 weeks   • DANIEL on CPAP     AHI 49/h   • Osteoarthritis     hands   • Other male erectile dysfunction 10/28/2019   • Peripheral neuropathy    • Seizure disorder (CMS/HCC)    • Traumatic brain injury (CMS/HCC)     after fall in 2003       Current Outpatient Medications   Medication Sig Dispense Refill   • Budesonide (ENTOCORT EC) 3 MG 24 hr capsule TAKE ONE CAPSULE BY MOUTH EVERY DAY AS NEEDED 30 capsule 11   • calcium carbonate-vitamin d (CALTRATE 600+D) 600-400 MG-UNIT per tablet Take  by mouth.     • Cholecalciferol  "(VITAMIN D) 1000 UNITS tablet Take  by mouth.     • glucosamine sulfate 500 MG capsule capsule Take  by mouth 3 (Three) Times a Day With Meals.     • traMADol (ULTRAM) 50 MG tablet Take 1 tablet by mouth At Night As Needed for Moderate Pain . 30 tablet 0     No current facility-administered medications for this visit.        PRN Meds:.    Allergies   Allergen Reactions   • Phenytoin        Social History     Socioeconomic History   • Marital status:      Spouse name: Not on file   • Number of children: 2   • Years of education: Not on file   • Highest education level: Not on file   Occupational History   • Occupation: retired teacher     Comment: [employer]   Tobacco Use   • Smoking status: Never Smoker   • Smokeless tobacco: Never Used   Substance and Sexual Activity   • Alcohol use: Yes     Comment: daily 2-3 beers/ daily, \"sometimes nothing' about 2x/ week   • Drug use: No   • Sexual activity: Yes     Partners: Female     Comment: wife only; hx of gonorrhea in youth   Lifestyle   • Physical activity     Days per week: 0 days     Minutes per session: 0 min   • Stress: Not on file   Social History Narrative    Diet - overall good, eats fruits and vegetables daily    Exercise - none current, in past did weights and walking    Caffeine - Tea       Family History   Problem Relation Age of Onset   • Kidney cancer Mother    • Diabetes Father    • Diabetes Sister    • Stroke Sister    • Scoliosis Brother    • No Known Problems Son    • No Known Problems Daughter    • Diabetes Sister    • Leukemia Sister    • No Known Problems Brother        Review of Systems   Constitutional: Negative for activity change, appetite change, fatigue and unexpected weight change.   HENT: Negative for congestion, facial swelling, sore throat, trouble swallowing and voice change.    Eyes: Negative for photophobia and visual disturbance.   Respiratory: Negative for cough and choking.    Cardiovascular: Negative for chest pain. "   Gastrointestinal: Negative for abdominal distention, abdominal pain, anal bleeding, blood in stool, constipation, diarrhea, nausea, rectal pain and vomiting.   Endocrine: Negative for polyphagia.   Musculoskeletal: Negative for arthralgias, gait problem and joint swelling.   Skin: Negative for color change, pallor and rash.   Allergic/Immunologic: Negative for food allergies.   Neurological: Negative for speech difficulty and headaches.   Hematological: Does not bruise/bleed easily.   Psychiatric/Behavioral: Negative for agitation, confusion and sleep disturbance.       Vitals:    10/29/20 1342   Temp: 99.3 °F (37.4 °C)       Physical Exam  Constitutional:       Appearance: He is well-developed.   HENT:      Head: Normocephalic.   Eyes:      Conjunctiva/sclera: Conjunctivae normal.   Neck:      Musculoskeletal: Normal range of motion.   Cardiovascular:      Rate and Rhythm: Normal rate and regular rhythm.   Pulmonary:      Breath sounds: Normal breath sounds.   Abdominal:      General: Bowel sounds are normal.      Palpations: Abdomen is soft.   Musculoskeletal: Normal range of motion.   Skin:     General: Skin is warm and dry.   Neurological:      Mental Status: He is alert and oriented to person, place, and time.   Psychiatric:         Behavior: Behavior normal.         ASSESSMENT  #1 microscopic colitis: Stable on low-dose budesonide      PLAN  Refill prescription  Patient to adjust dose down as tolerated  Follow-up in 1 year, anticipate colonoscopy to be scheduled at next follow-up      ICD-10-CM ICD-9-CM   1. Microscopic colitis, unspecified microscopic colitis type  K52.839 558.9

## 2020-11-03 LAB
ALBUMIN SERPL-MCNC: 4.4 G/DL (ref 3.5–5.2)
ALBUMIN/GLOB SERPL: 1.7 G/DL
ALP SERPL-CCNC: 101 U/L (ref 39–117)
ALT SERPL-CCNC: 13 U/L (ref 1–41)
APPEARANCE UR: CLEAR
AST SERPL-CCNC: 15 U/L (ref 1–40)
BACTERIA #/AREA URNS HPF: NORMAL /HPF
BASOPHILS # BLD AUTO: 0.01 10*3/MM3 (ref 0–0.2)
BASOPHILS NFR BLD AUTO: 0.3 % (ref 0–1.5)
BILIRUB SERPL-MCNC: 0.6 MG/DL (ref 0–1.2)
BILIRUB UR QL STRIP: NEGATIVE
BUN SERPL-MCNC: 11 MG/DL (ref 8–23)
BUN/CREAT SERPL: 12.5 (ref 7–25)
CALCIUM SERPL-MCNC: 9.5 MG/DL (ref 8.6–10.5)
CHLORIDE SERPL-SCNC: 96 MMOL/L (ref 98–107)
CHOLEST SERPL-MCNC: 154 MG/DL (ref 0–200)
CO2 SERPL-SCNC: 30.3 MMOL/L (ref 22–29)
COLOR UR: YELLOW
CREAT SERPL-MCNC: 0.88 MG/DL (ref 0.76–1.27)
EOSINOPHIL # BLD AUTO: 0.07 10*3/MM3 (ref 0–0.4)
EOSINOPHIL NFR BLD AUTO: 1.8 % (ref 0.3–6.2)
EPI CELLS #/AREA URNS HPF: NORMAL /HPF (ref 0–10)
ERYTHROCYTE [DISTWIDTH] IN BLOOD BY AUTOMATED COUNT: 12.2 % (ref 12.3–15.4)
GLOBULIN SER CALC-MCNC: 2.6 GM/DL
GLUCOSE SERPL-MCNC: 94 MG/DL (ref 65–99)
GLUCOSE UR QL: NEGATIVE
HCT VFR BLD AUTO: 38.9 % (ref 37.5–51)
HDLC SERPL-MCNC: 78 MG/DL (ref 40–60)
HGB BLD-MCNC: 13.9 G/DL (ref 13–17.7)
HGB UR QL STRIP: ABNORMAL
IMM GRANULOCYTES # BLD AUTO: 0.02 10*3/MM3 (ref 0–0.05)
IMM GRANULOCYTES NFR BLD AUTO: 0.5 % (ref 0–0.5)
KETONES UR QL STRIP: NEGATIVE
LDLC SERPL CALC-MCNC: 67 MG/DL (ref 0–100)
LEUKOCYTE ESTERASE UR QL STRIP: NEGATIVE
LYMPHOCYTES # BLD AUTO: 1.01 10*3/MM3 (ref 0.7–3.1)
LYMPHOCYTES NFR BLD AUTO: 25.8 % (ref 19.6–45.3)
MCH RBC QN AUTO: 33.5 PG (ref 26.6–33)
MCHC RBC AUTO-ENTMCNC: 35.7 G/DL (ref 31.5–35.7)
MCV RBC AUTO: 93.7 FL (ref 79–97)
MICRO URNS: ABNORMAL
MONOCYTES # BLD AUTO: 0.61 10*3/MM3 (ref 0.1–0.9)
MONOCYTES NFR BLD AUTO: 15.6 % (ref 5–12)
NEUTROPHILS # BLD AUTO: 2.2 10*3/MM3 (ref 1.7–7)
NEUTROPHILS NFR BLD AUTO: 56 % (ref 42.7–76)
NITRITE UR QL STRIP: NEGATIVE
NRBC BLD AUTO-RTO: 0 /100 WBC (ref 0–0.2)
PH UR STRIP: 7.5 [PH] (ref 5–7.5)
PLATELET # BLD AUTO: 206 10*3/MM3 (ref 140–450)
POTASSIUM SERPL-SCNC: 4.6 MMOL/L (ref 3.5–5.2)
PROT SERPL-MCNC: 7 G/DL (ref 6–8.5)
PROT UR QL STRIP: NEGATIVE
RBC # BLD AUTO: 4.15 10*6/MM3 (ref 4.14–5.8)
RBC #/AREA URNS HPF: NORMAL /HPF (ref 0–2)
SODIUM SERPL-SCNC: 131 MMOL/L (ref 136–145)
SP GR UR: 1.01 (ref 1–1.03)
TRIGL SERPL-MCNC: 40 MG/DL (ref 0–150)
URINALYSIS REFLEX: ABNORMAL
UROBILINOGEN UR STRIP-MCNC: 0.2 MG/DL (ref 0.2–1)
VLDLC SERPL CALC-MCNC: 9 MG/DL (ref 5–40)
WBC # BLD AUTO: 3.92 10*3/MM3 (ref 3.4–10.8)
WBC #/AREA URNS HPF: NORMAL /HPF (ref 0–5)

## 2020-11-05 ENCOUNTER — OFFICE VISIT (OUTPATIENT)
Dept: INTERNAL MEDICINE | Facility: CLINIC | Age: 70
End: 2020-11-05

## 2020-11-05 VITALS
OXYGEN SATURATION: 98 % | HEART RATE: 66 BPM | DIASTOLIC BLOOD PRESSURE: 60 MMHG | TEMPERATURE: 96.8 F | SYSTOLIC BLOOD PRESSURE: 120 MMHG | RESPIRATION RATE: 12 BRPM | BODY MASS INDEX: 25.45 KG/M2 | WEIGHT: 209 LBS | HEIGHT: 76 IN

## 2020-11-05 DIAGNOSIS — Z99.89 OSA ON CPAP: ICD-10-CM

## 2020-11-05 DIAGNOSIS — G47.33 OSA ON CPAP: ICD-10-CM

## 2020-11-05 DIAGNOSIS — G40.909 SEIZURE DISORDER (HCC): ICD-10-CM

## 2020-11-05 DIAGNOSIS — R26.81 GAIT INSTABILITY: ICD-10-CM

## 2020-11-05 DIAGNOSIS — G56.03 CARPAL TUNNEL SYNDROME ON BOTH SIDES: ICD-10-CM

## 2020-11-05 DIAGNOSIS — D75.89 MACROCYTOSIS WITHOUT ANEMIA: ICD-10-CM

## 2020-11-05 DIAGNOSIS — M19.012 PRIMARY OSTEOARTHRITIS OF BOTH SHOULDERS: ICD-10-CM

## 2020-11-05 DIAGNOSIS — L71.9 ROSACEA: ICD-10-CM

## 2020-11-05 DIAGNOSIS — M19.011 PRIMARY OSTEOARTHRITIS OF BOTH SHOULDERS: ICD-10-CM

## 2020-11-05 DIAGNOSIS — N42.89 PROSTATE ASYMMETRY: ICD-10-CM

## 2020-11-05 DIAGNOSIS — Z00.00 HEALTHCARE MAINTENANCE: Primary | ICD-10-CM

## 2020-11-05 DIAGNOSIS — K52.839 MICROSCOPIC COLITIS, UNSPECIFIED MICROSCOPIC COLITIS TYPE: ICD-10-CM

## 2020-11-05 DIAGNOSIS — E87.1 HYPONATREMIA: ICD-10-CM

## 2020-11-05 DIAGNOSIS — I87.2 VENOUS INSUFFICIENCY OF BOTH LOWER EXTREMITIES: ICD-10-CM

## 2020-11-05 PROCEDURE — G0439 PPPS, SUBSEQ VISIT: HCPCS | Performed by: INTERNAL MEDICINE

## 2020-11-05 PROCEDURE — 99397 PER PM REEVAL EST PAT 65+ YR: CPT | Performed by: INTERNAL MEDICINE

## 2020-11-05 RX ORDER — ASCORBIC ACID 500 MG
500 TABLET ORAL DAILY
COMMUNITY

## 2020-11-05 NOTE — PROGRESS NOTES
"Subjective     Chief Complaint   Patient presents with   • Annual Exam     review of medical issues        HPI:  Kadeem Reyes is a 70 y.o. male RTC in yearly CPE/ AWV, review of medical issues:   Has been doing well. 'I have been lazy'. Had pinched nerve in back about 2 years ago, resovled with epidurals. No pain now. Feels like 'lazy' since then.  NO exercise at all.  Weight has been stable.    1. CTS B - really profound presentation, improved now s/p hand surgery eval with bracing and CT injx.  Still wearing braces at night.  No sx return since started. \"I used to have pain to my shoulder'. Had only the one injx in each wrist in 2018 and none since. No weakness noted in hads.  F/U PRN with hand surgery.   2. Varicose veins - new dx in 2019, s/p eval with vascular surgery still using long term compression on L only. No issues on R leg.   3. Seizure Disorder - occurred after fell down the steps 16 years ago with TBI and coma x 5 weeks.  On meds for > decade and no issues.  No seizures over year. Never did see neurology in f/u and MD retired.   4. TBI - after fall down steps ~16 years ago while intoxicated. In ICU with trach and PEG at time.  Has made near full recovery with only noted mild L sided weakness long term.    5. Microscopic colitis - dx'd in 2016 after C-scope with Dr. Richmond.  Saw GI recently and noted no sx.  Stopped steroids and told to use PRN for sx. Has not taken one for last 10 days.   6. DANIEL - on CPAP.  Feels like is doing well. Sleep is good.    7. Rosacea - LUCIA. No return of sx.   8. Stiff gait - very notable on exam, present for ~5 years per pt, unclear etiology.  Pt did see PT and felt like did not help much.  Gait has not changed much over year.  \"I feel like I am stiff\". Recalls that recouperated after TBI in past then had back issues in ~2018 and that slowed down.  Was walking prior to that. Thinks did not see anyone in pain clinic in past just had epidural shots. PT cannot recall a lot " of detail.      The following portions of the patient's history were reviewed and updated as appropriate: allergies, current medications, past family history, past medical history, past social history, past surgical history and problem list.    Review of Systems   Constitution: Negative for chills, fever, malaise/fatigue, weight gain and weight loss.   HENT: Positive for hearing loss (R ear does not work since TBI.  L ear is 'fine'. ). Negative for congestion, odynophagia and sore throat.    Eyes: Negative for discharge, double vision, pain and redness.        Last eye exam 2019, has appt upcoming. Wears glasses.     Cardiovascular: Negative for chest pain, dyspnea on exertion, irregular heartbeat, leg swelling, near-syncope, palpitations and syncope.   Respiratory: Negative for cough, shortness of breath, sleep disturbances due to breathing (on CPAP) and snoring.    Endocrine: Negative for polydipsia, polyphagia and polyuria.   Hematologic/Lymphatic: Negative for bleeding problem. Does not bruise/bleed easily.   Skin: Negative for rash and suspicious lesions.   Musculoskeletal: Positive for arthritis (R shoulder) and joint pain (did see Dr. Palacios in ortho over year for distant rotator cuff issues.  Pain is resolved.  Consideration for injx, 'I dont need that right now'. ). Negative for back pain, joint swelling, muscle cramps, muscle weakness and myalgias.   Gastrointestinal: Negative for constipation, diarrhea, dysphagia, heartburn, nausea and vomiting.   Genitourinary: Positive for nocturia (1-2x/ night). Negative for dysuria, frequency, hematuria and hesitancy.   Neurological: Negative for dizziness, headaches and light-headedness.   Psychiatric/Behavioral: Negative for depression. The patient does not have insomnia and is not nervous/anxious.    Allergic/Immunologic: Negative for environmental allergies and persistent infections.       Patient Care Team:  Kadeem Squires MD as PCP - General (Internal  Medicine)    Recent Hospitalizations: No recent hospitalization(s).    Depression Screen:   PHQ-2/PHQ-9 Depression Screening 11/5/2020   Little interest or pleasure in doing things 0   Feeling down, depressed, or hopeless 0   Trouble falling or staying asleep, or sleeping too much -   Feeling tired or having little energy -   Poor appetite or overeating -   Feeling bad about yourself - or that you are a failure or have let yourself or your family down -   Trouble concentrating on things, such as reading the newspaper or watching television -   Moving or speaking so slowly that other people could have noticed. Or the opposite - being so fidgety or restless that you have been moving around a lot more than usual -   Thoughts that you would be better off dead, or of hurting yourself in some way -   Total Score 0   If you checked off any problems, how difficult have these problems made it for you to do your work, take care of things at home, or get along with other people? -       Functional and Cognitive Screening:  Functional & Cognitive Status 11/5/2020   Do you have difficulty preparing food and eating? No   Do you have difficulty bathing yourself, getting dressed or grooming yourself? No   Do you have difficulty using the toilet? No   Do you have difficulty moving around from place to place? No   Do you have trouble with steps or getting out of a bed or a chair? No   Current Diet Well Balanced Diet   Dental Exam Up to date        Dental Exam Comment -   Eye Exam Up to date        Eye Exam Comment -   Exercise (times per week) 0 times per week   Current Exercise Activities Include None   Do you need help using the phone?  No   Are you deaf or do you have serious difficulty hearing?  No   Do you need help with transportation? No   Do you need help shopping? No   Do you need help preparing meals?  No   Do you need help with housework?  No   Do you need help with laundry? No   Do you need help taking your medications?  "No   Do you need help managing money? No   Do you ever drive or ride in a car without wearing a seat belt? No   Have you felt unusual stress, anger or loneliness in the last month? -   Who do you live with? Spouse   If you need help, do you have trouble finding someone available to you? No   Have you been bothered in the last four weeks by sexual problems? No   Do you have difficulty concentrating, remembering or making decisions? -       Does the patient have evidence of cognitive impairment? no    Does not need ASA (and currently is not on it)    Vitals:    11/05/20 0948   BP: 120/60   Pulse: 66   Resp: 12   Temp: 96.8 °F (36 °C)   SpO2: 98%   Weight: 94.8 kg (209 lb)   Height: 193 cm (76\")   PainSc: 0-No pain       Body mass index is 25.44 kg/m².    Visual Acuity:  No exam data present    Advanced Care Planning:  ACP discussion was held with the patient during this visit. Patient has an advance directive (not in EMR), copy requested.    Objective   Physical Exam  Vitals signs reviewed.   Constitutional:       General: He is not in acute distress.     Appearance: Normal appearance. He is well-developed. He is not ill-appearing or toxic-appearing.   HENT:      Head: Normocephalic and atraumatic.      Right Ear: Tympanic membrane, ear canal and external ear normal.      Left Ear: Hearing, tympanic membrane, ear canal and external ear normal.      Nose: Nose normal.      Mouth/Throat:      Mouth: Mucous membranes are moist. No oral lesions.      Tongue: No lesions.      Pharynx: Oropharynx is clear. Uvula midline. No pharyngeal swelling, oropharyngeal exudate, posterior oropharyngeal erythema or uvula swelling.   Eyes:      General: Lids are normal. No scleral icterus.        Right eye: No discharge.         Left eye: No discharge.      Extraocular Movements: Extraocular movements intact.      Conjunctiva/sclera: Conjunctivae normal.      Pupils: Pupils are equal, round, and reactive to light.   Neck:      " Musculoskeletal: Full passive range of motion without pain, normal range of motion and neck supple.      Thyroid: No thyroid mass or thyromegaly.      Vascular: No carotid bruit.   Cardiovascular:      Rate and Rhythm: Normal rate and regular rhythm.      Pulses:           Radial pulses are 2+ on the right side and 2+ on the left side.        Dorsalis pedis pulses are 2+ on the right side and 2+ on the left side.        Posterior tibial pulses are 2+ on the right side and 2+ on the left side.      Heart sounds: Normal heart sounds, S1 normal and S2 normal. No murmur. No friction rub. No gallop.       Comments: Varicose veins noted B lower legs.     Pulmonary:      Effort: Pulmonary effort is normal. No respiratory distress.      Breath sounds: Normal breath sounds. No wheezing, rhonchi or rales.   Abdominal:      General: Bowel sounds are normal. There is no distension.      Palpations: Abdomen is soft. There is no mass.      Tenderness: There is no abdominal tenderness. There is no guarding or rebound.   Musculoskeletal:         General: No deformity.      Right shoulder: He exhibits decreased range of motion (mild reduction in overhead raise). He exhibits no tenderness, no bony tenderness and no pain.      Left shoulder: He exhibits decreased range of motion (mild reduction in overhead raise. ). He exhibits no tenderness, no bony tenderness and no pain.      Right lower leg: No edema.      Left lower leg: No edema.   Lymphadenopathy:      Cervical: No cervical adenopathy.      Right cervical: No superficial, deep or posterior cervical adenopathy.     Left cervical: No superficial, deep or posterior cervical adenopathy.      Upper Body:      Right upper body: No supraclavicular, axillary or pectoral adenopathy.      Left upper body: No supraclavicular, axillary or pectoral adenopathy.   Skin:     General: Skin is warm and dry.      Findings: No rash.   Neurological:      Mental Status: He is alert and oriented to  person, place, and time.      Cranial Nerves: No cranial nerve deficit.      Sensory: No sensory deficit.      Motor: No weakness, tremor, atrophy or abnormal muscle tone.      Gait: Gait abnormal (slow, short stride, limps on L ).      Deep Tendon Reflexes: Reflexes are normal and symmetric.      Reflex Scores:       Patellar reflexes are 2+ on the right side and 2+ on the left side.       Achilles reflexes are 2+ on the right side and 2+ on the left side.     Comments: Normal eye movements, blinking     Psychiatric:         Attention and Perception: Attention normal.         Mood and Affect: Mood normal.         Speech: Speech normal.         Behavior: Behavior normal. Behavior is cooperative.         Thought Content: Thought content normal.     Pt considers health this year about the same as last.   Mental health is about the same as well.     Chart reviewed for high risk medication and for drug interactions.      Assessment/Plan   Diagnoses and all orders for this visit:    1. Healthcare maintenance (Primary)    2. DANIEL on CPAP    3. Venous insufficiency of both lower extremities    4. Microscopic colitis, unspecified microscopic colitis type    5. Carpal tunnel syndrome on both sides    6. Seizure disorder (CMS/HCC)    7. Primary osteoarthritis of both shoulders    8. Rosacea    9. Prostate asymmetry    10. Macrocytosis without anemia    11. Gait instability    12. Hyponatremia            Diagnoses and all orders for this visit:    Healthcare maintenance    DANIEL on CPAP    Venous insufficiency of both lower extremities    Microscopic colitis, unspecified microscopic colitis type    Carpal tunnel syndrome on both sides    Seizure disorder (CMS/HCC)    Primary osteoarthritis of both shoulders    Rosacea    Prostate asymmetry    Macrocytosis without anemia    Gait instability    Hyponatremia    Other orders  -     vitamin C (ASCORBIC ACID) 500 MG tablet; Take 500 mg by mouth Daily.        Kadeem Reyes is a 70 y.o.  male RTC in yearly CPE/ AWV, review of medical issues:     1. CTS B - really profound presentation, improved now s/p hand surgery eval with bracing and CT injx.  No weakness in  on exam, pt asx'ic. C/W bracing nightly. F/U hand surgery PRN.  2. Varicose veins - new dx in 2019, s/p eval with vascular surgery still using long term compression on L only.   3. Seizure Disorder - occurred after fell down the steps 16 years ago with TBI and coma x 5 weeks.  On meds for > decade, now off and no return of seizures. Recall, 6/2018 neuro note with no noted seizures off 3 month med cessation trial and negative EEG. Neuro f/u PRN at this time after neuro retired/ no sx.   4. TBI - after fall down steps ~16 years ago while intoxicated. In ICU with trach and PEG at time.  Has made near full recovery with only noted mild L sided weakness long term.    5. Microscopic colitis - dx'd in 2016 after C-scope with Dr. Richmond.  Reviewed 10/2020 noted from GI with plans for trial of steroid dose reduction 'as tolerated'.     6. DANIEL - on CPAP.  [facility] sleep med 6/2020.  7. Rosacea - LUCIA.   8.Slow gait - very notable on exam, present for ~5 years per pt. Pt notes recouperated after TBI in past then had back issues in ~2018 and that slowed him down, but responded to epidural shots. Saw PT in 2019, felt unhelpful, but not compliant with exercises.  Has no tremor, no lack of eye blinking, and really does NOT have a shuffling gait. Gait is stiff and pt limps on L, suspect this is really more arthritic and deconditioning related. Declined neuro re-eval for Parkinson's r/o in past. I urged pt to walk for exercise as would be helpful to see if can make progress, but seems unmotivated to do so.  Will monitor for now.   9. Hyponatremia - chronic since TBI, per pt.  Stable on labs. Monitor.    10. BPH - minimal sx. Sees urology annually, s/p recent eval. Will await note. Recall, hx of prostate asymmetry in 2011, s/p (-) bx.   11. HM - labs d/w pt;  Flu/ Tdap/ Pvax/ Prevnar - UTD; Hep A and Shingrix - at pharmacy; C-scope 3/2012 (-) --> 10 years; MITCH/ PSA per urology;  Hep C Ab (-) 12/2016; Preventative care plan provided to pt at end of visit     Return in about 1 year (around 11/5/2021) for Medicare Wellness, Annual physical.          Current Outpatient Medications:   •  calcium carbonate-vitamin d (CALTRATE 600+D) 600-400 MG-UNIT per tablet, Take  by mouth., Disp: , Rfl:   •  Cholecalciferol (VITAMIN D) 1000 UNITS tablet, Take  by mouth., Disp: , Rfl:   •  glucosamine sulfate 500 MG capsule capsule, Take  by mouth 3 (Three) Times a Day With Meals., Disp: , Rfl:   •  vitamin C (ASCORBIC ACID) 500 MG tablet, Take 500 mg by mouth Daily., Disp: , Rfl:   •  Budesonide (ENTOCORT EC) 3 MG 24 hr capsule, TAKE ONE CAPSULE BY MOUTH EVERY DAY AS NEEDED, Disp: 30 capsule, Rfl: 11

## 2020-11-05 NOTE — PATIENT INSTRUCTIONS
Shingrix (new shingles shot; 2 shot series) check at pharmacy  Hepatitis A (2 shot series) check at pharmacy    Medicare Wellness  Personal Prevention Plan of Service     Date of Office Visit:  2020  Encounter Provider:  Kadeem Squires MD  Place of Service:  [facility] INTERNAL MEDICINE  Patient Name: Kadeem Reyes  :  1950    As part of the Medicare Wellness portion of your visit today, we are providing you with this personalized preventive plan of services (PPPS). This plan is based upon recommendations of the [place] Preventive Services Task Force (USPSTF) and the Advisory Committee on Immunization Practices (ACIP).    This lists the preventive care services that should be considered, and provides dates of when you are due. Items listed as completed are up-to-date and do not require any further intervention.    Health Maintenance   Topic Date Due   • ZOSTER VACCINE (1 of 2) 2001   • TDAP/TD VACCINES (2 - Td) 2021   • ANNUAL WELLNESS VISIT  2021   • COLONOSCOPY  2022   • HEPATITIS C SCREENING  Completed   • INFLUENZA VACCINE  Completed   • Pneumococcal Vaccine 65+  Completed       No orders of the defined types were placed in this encounter.      Return in about 1 year (around 2021) for Medicare Wellness, Annual physical.

## 2021-03-19 ENCOUNTER — BULK ORDERING (OUTPATIENT)
Dept: CASE MANAGEMENT | Facility: OTHER | Age: 71
End: 2021-03-19

## 2021-03-19 DIAGNOSIS — Z23 IMMUNIZATION DUE: ICD-10-CM

## 2021-06-10 ENCOUNTER — OFFICE VISIT (OUTPATIENT)
Dept: SLEEP MEDICINE | Facility: HOSPITAL | Age: 71
End: 2021-06-10

## 2021-06-10 VITALS
HEART RATE: 63 BPM | SYSTOLIC BLOOD PRESSURE: 129 MMHG | HEIGHT: 76 IN | BODY MASS INDEX: 26.3 KG/M2 | DIASTOLIC BLOOD PRESSURE: 73 MMHG | OXYGEN SATURATION: 98 % | WEIGHT: 216 LBS

## 2021-06-10 DIAGNOSIS — Z99.89 OSA ON CPAP: Primary | ICD-10-CM

## 2021-06-10 DIAGNOSIS — R26.81 GAIT INSTABILITY: ICD-10-CM

## 2021-06-10 DIAGNOSIS — G47.33 OSA ON CPAP: Primary | ICD-10-CM

## 2021-06-10 PROCEDURE — 99213 OFFICE O/P EST LOW 20 MIN: CPT | Performed by: INTERNAL MEDICINE

## 2021-06-10 PROCEDURE — G0463 HOSPITAL OUTPT CLINIC VISIT: HCPCS
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
## 2021-07-19 ENCOUNTER — TELEPHONE (OUTPATIENT)
Dept: INTERNAL MEDICINE | Facility: CLINIC | Age: 71
End: 2021-07-19

## 2021-07-19 NOTE — TELEPHONE ENCOUNTER
Caller: Kadeem Reyes    Relationship: Self    Best call back number:[phone]     What form or medical record are you requesting: EXCUSE FOR JURY DUTY DUE TO PATIENTS LIMITED MOBILITY      How would you like to receive the form or medical records (pick-up, mail, fax):      Timeframe paperwork needed: HAS 5 DAYS TO TURN IN EXCUSE

## 2021-10-26 ENCOUNTER — OFFICE VISIT (OUTPATIENT)
Dept: GASTROENTEROLOGY | Facility: CLINIC | Age: 71
End: 2021-10-26

## 2021-10-26 VITALS — BODY MASS INDEX: 26.28 KG/M2 | HEIGHT: 76 IN | WEIGHT: 215.8 LBS | TEMPERATURE: 96.9 F

## 2021-10-26 DIAGNOSIS — K52.839 MICROSCOPIC COLITIS, UNSPECIFIED MICROSCOPIC COLITIS TYPE: Primary | ICD-10-CM

## 2021-10-26 PROCEDURE — 99213 OFFICE O/P EST LOW 20 MIN: CPT | Performed by: INTERNAL MEDICINE

## 2021-10-26 RX ORDER — MULTIPLE VITAMINS W/ MINERALS TAB 9MG-400MCG
1 TAB ORAL DAILY
COMMUNITY

## 2021-10-26 RX ORDER — VITAMIN E 268 MG
400 CAPSULE ORAL DAILY
COMMUNITY

## 2021-10-26 NOTE — PROGRESS NOTES
Chief Complaint   Patient presents with   • Blayne Reyes is a  71 y.o. male here for a follow up visit for microscopic colitis, gas    HPI this 71-year-old white male patient of Dr. Kadeem Squires presents in follow-up since last seen in October 2020.  He carries a diagnosis of microscopic colitis established back in 2012 after colonoscopic examination by Dr. Tona Churchill.  He had been on budesonide but recently stopped this in August.  He denies any current lower GI related complaints except for excess bowel gas which he attributes to eating cereal.  More specifically he recounts it relates to the cereal and not milk products as he has been able to tolerate milk and other forms without difficulty.  He states this occurred with oatmeal but with other cereal products.  We talked about adjustment of his diet to see which products seem to precipitate the problem.  We also talked about symptomatic treatment with activated charcoal.  He would be due for follow-up colonoscopy in March 2022.  He can certainly exercise an open access process to prevent having to return in follow-up as long as his history does not change.    Past Medical History:   Diagnosis Date   • Benign prostatic hyperplasia without lower urinary tract symptoms 10/28/2019   • Carpal tunnel syndrome on both sides 10/28/2019    Profound presentation with pain,  weakness, and arm pain to shoulder in 2019; improved with injx and braces. S/P hand surgery eval with Dr. Sauceda.    • Gait instability    • History of gout    • Hyponatremia    • Intracranial hemorrhage (HCC)     after fall down steps in ~2003, coma x ~5 weeks   • Microscopic colitis    • DANIEL on CPAP     AHI 49/h   • Osteoarthritis     hands   • Other male erectile dysfunction 10/28/2019   • Peripheral neuropathy    • Seizure disorder (HCC)    • Traumatic brain injury (HCC)     after fall in 2003       Current Outpatient Medications   Medication Sig Dispense Refill   • calcium  "carbonate-vitamin d (CALTRATE 600+D) 600-400 MG-UNIT per tablet Take  by mouth.     • Cholecalciferol (VITAMIN D) 1000 UNITS tablet Take  by mouth.     • glucosamine sulfate 500 MG capsule capsule Take  by mouth 3 (Three) Times a Day With Meals.     • multivitamin with minerals tablet tablet Take 1 tablet by mouth Daily.     • vitamin C (ASCORBIC ACID) 500 MG tablet Take 500 mg by mouth Daily.     • vitamin E 400 UNIT capsule Take 400 Units by mouth Daily.     • Budesonide (ENTOCORT EC) 3 MG 24 hr capsule TAKE ONE CAPSULE BY MOUTH EVERY DAY AS NEEDED 30 capsule 11     No current facility-administered medications for this visit.       PRN Meds:.    Allergies   Allergen Reactions   • Phenytoin        Social History     Socioeconomic History   • Marital status:    • Number of children: 2   Tobacco Use   • Smoking status: Never Smoker   • Smokeless tobacco: Never Used   Vaping Use   • Vaping Use: Never used   Substance and Sexual Activity   • Alcohol use: Yes     Comment: daily 2-3 beers/ daily, \"sometimes nothing' about 2x/ week   • Drug use: No   • Sexual activity: Yes     Partners: Female     Comment: wife only; hx of gonorrhea in youth       Family History   Problem Relation Age of Onset   • Kidney cancer Mother    • Diabetes Father    • Diabetes Sister    • Stroke Sister    • Scoliosis Brother    • No Known Problems Son    • No Known Problems Daughter    • Diabetes Sister    • Leukemia Sister    • No Known Problems Brother        Review of Systems   Constitutional: Negative for activity change, appetite change, fatigue and unexpected weight change.   HENT: Negative for congestion, facial swelling, sore throat, trouble swallowing and voice change.    Eyes: Negative for photophobia and visual disturbance.   Respiratory: Negative for cough and choking.    Cardiovascular: Negative for chest pain.   Gastrointestinal: Negative for abdominal distention, abdominal pain, anal bleeding, blood in stool, constipation, " diarrhea, nausea, rectal pain and vomiting.        Excess gas   Endocrine: Negative for polyphagia.   Musculoskeletal: Negative for arthralgias, gait problem and joint swelling.   Skin: Negative for color change, pallor and rash.   Allergic/Immunologic: Negative for food allergies.   Neurological: Negative for speech difficulty and headaches.   Hematological: Does not bruise/bleed easily.   Psychiatric/Behavioral: Negative for agitation, confusion and sleep disturbance.       Vitals:    10/26/21 1240   Temp: 96.9 °F (36.1 °C)       Physical Exam  Constitutional:       Appearance: He is well-developed.   HENT:      Head: Normocephalic.   Eyes:      Conjunctiva/sclera: Conjunctivae normal.   Cardiovascular:      Rate and Rhythm: Normal rate and regular rhythm.   Pulmonary:      Breath sounds: Normal breath sounds.   Abdominal:      General: Bowel sounds are normal.      Palpations: Abdomen is soft.   Musculoskeletal:         General: Normal range of motion.      Cervical back: Normal range of motion.   Skin:     General: Skin is warm and dry.   Neurological:      Mental Status: He is alert and oriented to person, place, and time.   Psychiatric:         Behavior: Behavior normal.         ASSESSMENT   #1 microscopic colitis: Currently controlled without medication.  #2 screening for colorectal cancer: Due for follow-up examination in March 2022  #3 excess gas: Advised to adjust diet and treat symptomatically.    PLAN  Patient to consider open access program for follow-up colonoscopy in March 2022  Adjust diet  Initiate activated charcoal as needed      ICD-10-CM ICD-9-CM   1. Microscopic colitis, unspecified microscopic colitis type  K52.839 558.9

## 2021-11-02 DIAGNOSIS — D75.89 MACROCYTOSIS WITHOUT ANEMIA: ICD-10-CM

## 2021-11-02 DIAGNOSIS — G40.909 SEIZURE DISORDER (HCC): ICD-10-CM

## 2021-11-02 DIAGNOSIS — E78.89 LIPIDS ABNORMAL: ICD-10-CM

## 2021-11-02 DIAGNOSIS — E87.1 HYPONATREMIA: ICD-10-CM

## 2021-11-02 DIAGNOSIS — Z00.00 HEALTHCARE MAINTENANCE: Primary | ICD-10-CM

## 2021-11-09 LAB
ALBUMIN SERPL-MCNC: 4.1 G/DL (ref 3.8–4.8)
ALBUMIN/GLOB SERPL: 1.5 {RATIO} (ref 1.2–2.2)
ALP SERPL-CCNC: 96 IU/L (ref 44–121)
ALT SERPL-CCNC: 13 IU/L (ref 0–44)
APPEARANCE UR: CLEAR
AST SERPL-CCNC: 14 IU/L (ref 0–40)
BACTERIA #/AREA URNS HPF: NORMAL /[HPF]
BASOPHILS # BLD AUTO: 0 X10E3/UL (ref 0–0.2)
BASOPHILS NFR BLD AUTO: 0 %
BILIRUB SERPL-MCNC: 0.6 MG/DL (ref 0–1.2)
BILIRUB UR QL STRIP: NEGATIVE
BUN SERPL-MCNC: 17 MG/DL (ref 8–27)
BUN/CREAT SERPL: 19 (ref 10–24)
CALCIUM SERPL-MCNC: 9.5 MG/DL (ref 8.6–10.2)
CASTS URNS QL MICRO: NORMAL /LPF
CHLORIDE SERPL-SCNC: 95 MMOL/L (ref 96–106)
CHOLEST SERPL-MCNC: 145 MG/DL (ref 100–199)
CO2 SERPL-SCNC: 24 MMOL/L (ref 20–29)
COLOR UR: YELLOW
CREAT SERPL-MCNC: 0.88 MG/DL (ref 0.76–1.27)
EOSINOPHIL # BLD AUTO: 0.1 X10E3/UL (ref 0–0.4)
EOSINOPHIL NFR BLD AUTO: 1 %
EPI CELLS #/AREA URNS HPF: NORMAL /HPF (ref 0–10)
ERYTHROCYTE [DISTWIDTH] IN BLOOD BY AUTOMATED COUNT: 11.7 % (ref 11.6–15.4)
GLOBULIN SER CALC-MCNC: 2.7 G/DL (ref 1.5–4.5)
GLUCOSE SERPL-MCNC: 81 MG/DL (ref 65–99)
GLUCOSE UR QL: NEGATIVE
HCT VFR BLD AUTO: 38.6 % (ref 37.5–51)
HDLC SERPL-MCNC: 56 MG/DL
HGB BLD-MCNC: 13 G/DL (ref 13–17.7)
HGB UR QL STRIP: NEGATIVE
IMM GRANULOCYTES # BLD AUTO: 0 X10E3/UL (ref 0–0.1)
IMM GRANULOCYTES NFR BLD AUTO: 0 %
KETONES UR QL STRIP: ABNORMAL
LDLC SERPL CALC-MCNC: 78 MG/DL (ref 0–99)
LEUKOCYTE ESTERASE UR QL STRIP: NEGATIVE
LYMPHOCYTES # BLD AUTO: 1.2 X10E3/UL (ref 0.7–3.1)
LYMPHOCYTES NFR BLD AUTO: 22 %
MCH RBC QN AUTO: 32.7 PG (ref 26.6–33)
MCHC RBC AUTO-ENTMCNC: 33.7 G/DL (ref 31.5–35.7)
MCV RBC AUTO: 97 FL (ref 79–97)
MICRO URNS: ABNORMAL
MICRO URNS: ABNORMAL
MONOCYTES # BLD AUTO: 0.7 X10E3/UL (ref 0.1–0.9)
MONOCYTES NFR BLD AUTO: 14 %
NEUTROPHILS # BLD AUTO: 3.2 X10E3/UL (ref 1.4–7)
NEUTROPHILS NFR BLD AUTO: 63 %
NITRITE UR QL STRIP: NEGATIVE
PH UR STRIP: 7 [PH] (ref 5–7.5)
PLATELET # BLD AUTO: 216 X10E3/UL (ref 150–450)
POTASSIUM SERPL-SCNC: 4.9 MMOL/L (ref 3.5–5.2)
PROT SERPL-MCNC: 6.8 G/DL (ref 6–8.5)
PROT UR QL STRIP: NEGATIVE
RBC # BLD AUTO: 3.97 X10E6/UL (ref 4.14–5.8)
RBC #/AREA URNS HPF: NORMAL /HPF (ref 0–2)
SODIUM SERPL-SCNC: 131 MMOL/L (ref 134–144)
SP GR UR: 1.01 (ref 1–1.03)
TRIGL SERPL-MCNC: 50 MG/DL (ref 0–149)
URINALYSIS REFLEX: ABNORMAL
UROBILINOGEN UR STRIP-MCNC: 0.2 MG/DL (ref 0.2–1)
VLDLC SERPL CALC-MCNC: 11 MG/DL (ref 5–40)
WBC # BLD AUTO: 5.2 X10E3/UL (ref 3.4–10.8)
WBC #/AREA URNS HPF: NORMAL /HPF (ref 0–5)

## 2021-11-11 ENCOUNTER — OFFICE VISIT (OUTPATIENT)
Dept: INTERNAL MEDICINE | Facility: CLINIC | Age: 71
End: 2021-11-11

## 2021-11-11 VITALS
SYSTOLIC BLOOD PRESSURE: 120 MMHG | HEIGHT: 76 IN | HEART RATE: 65 BPM | DIASTOLIC BLOOD PRESSURE: 70 MMHG | BODY MASS INDEX: 26.06 KG/M2 | WEIGHT: 214 LBS | OXYGEN SATURATION: 98 % | RESPIRATION RATE: 12 BRPM | TEMPERATURE: 97.1 F

## 2021-11-11 DIAGNOSIS — K52.839 MICROSCOPIC COLITIS, UNSPECIFIED MICROSCOPIC COLITIS TYPE: ICD-10-CM

## 2021-11-11 DIAGNOSIS — M19.011 PRIMARY OSTEOARTHRITIS OF BOTH SHOULDERS: ICD-10-CM

## 2021-11-11 DIAGNOSIS — Z23 NEED FOR INFLUENZA VACCINATION: ICD-10-CM

## 2021-11-11 DIAGNOSIS — N52.8 OTHER MALE ERECTILE DYSFUNCTION: ICD-10-CM

## 2021-11-11 DIAGNOSIS — M19.012 PRIMARY OSTEOARTHRITIS OF BOTH SHOULDERS: ICD-10-CM

## 2021-11-11 DIAGNOSIS — Z99.89 OSA ON CPAP: ICD-10-CM

## 2021-11-11 DIAGNOSIS — I87.2 VENOUS INSUFFICIENCY OF BOTH LOWER EXTREMITIES: ICD-10-CM

## 2021-11-11 DIAGNOSIS — E87.1 HYPONATREMIA: ICD-10-CM

## 2021-11-11 DIAGNOSIS — R26.81 GAIT INSTABILITY: ICD-10-CM

## 2021-11-11 DIAGNOSIS — G40.909 SEIZURE DISORDER (HCC): ICD-10-CM

## 2021-11-11 DIAGNOSIS — L71.9 ROSACEA: ICD-10-CM

## 2021-11-11 DIAGNOSIS — G47.33 OSA ON CPAP: ICD-10-CM

## 2021-11-11 DIAGNOSIS — Z00.00 HEALTHCARE MAINTENANCE: Primary | ICD-10-CM

## 2021-11-11 DIAGNOSIS — R91.8 LUNG FIELD ABNORMAL FINDING ON EXAMINATION: ICD-10-CM

## 2021-11-11 DIAGNOSIS — R06.2 WHEEZE: ICD-10-CM

## 2021-11-11 PROCEDURE — 1170F FXNL STATUS ASSESSED: CPT | Performed by: INTERNAL MEDICINE

## 2021-11-11 PROCEDURE — 1126F AMNT PAIN NOTED NONE PRSNT: CPT | Performed by: INTERNAL MEDICINE

## 2021-11-11 PROCEDURE — 1159F MED LIST DOCD IN RCRD: CPT | Performed by: INTERNAL MEDICINE

## 2021-11-11 PROCEDURE — G0439 PPPS, SUBSEQ VISIT: HCPCS | Performed by: INTERNAL MEDICINE

## 2021-11-11 PROCEDURE — 99397 PER PM REEVAL EST PAT 65+ YR: CPT | Performed by: INTERNAL MEDICINE

## 2021-11-11 PROCEDURE — 90662 IIV NO PRSV INCREASED AG IM: CPT | Performed by: INTERNAL MEDICINE

## 2021-11-11 PROCEDURE — G0008 ADMIN INFLUENZA VIRUS VAC: HCPCS | Performed by: INTERNAL MEDICINE
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
## 2021-11-11 NOTE — PATIENT INSTRUCTIONS
Derm:   Dr. Loraine Kemp at [facility]  Dr. Ric Rinaldi [facility].    Dr. Rosalie Gutierres at [facility]      Vaccines:   Tdap (Tetanus with whooping cough booster) at pharmacy  Shingrix , check on 2nd shot in series at pharmacy  Hepatitis A (2 shot series) consider at pharmacy  COVID booster at pharmacy    Medicare Wellness  Personal Prevention Plan of Service     Date of Office Visit:  2021  Encounter Provider:  Kadeem Squires MD  Place of Service:  [facility]  Patient Name: Kadeem Reyse  :  1950    As part of the Medicare Wellness portion of your visit today, we are providing you with this personalized preventive plan of services (PPPS). This plan is based upon recommendations of the [place] Preventive Services Task Force (USPSTF) and the Advisory Committee on Immunization Practices (ACIP).    This lists the preventive care services that should be considered, and provides dates of when you are due. Items listed as completed are up-to-date and do not require any further intervention.    Health Maintenance   Topic Date Due   • TDAP/TD VACCINES (2 - Td or Tdap) 2021   • ZOSTER VACCINE (2 of 2) 2021   • COVID-19 Vaccine (3 - Pfizer booster) 10/03/2021   • COLORECTAL CANCER SCREENING  2022   • ANNUAL WELLNESS VISIT  2022   • HEPATITIS C SCREENING  Completed   • INFLUENZA VACCINE  Completed   • Pneumococcal Vaccine 65+  Completed       Orders Placed This Encounter   Procedures   • XR Chest PA & Lateral     Standing Status:   Future     Standing Expiration Date:   2022     Order Specific Question:   Reason for Exam:     Answer:   wheeze L lung base, hx of trach in distant past     Order Specific Question:   Release to patient     Answer:   Immediate   • Fluzone High-Dose 65+yrs ([id number])       Return in about 1 year (around 2022) for Annual physical.

## 2021-11-19 ENCOUNTER — HOSPITAL ENCOUNTER (OUTPATIENT)
Dept: GENERAL RADIOLOGY | Facility: HOSPITAL | Age: 71
Discharge: HOME | End: 2021-11-19
Admitting: INTERNAL MEDICINE

## 2021-11-19 DIAGNOSIS — R06.2 WHEEZE: ICD-10-CM

## 2021-11-19 DIAGNOSIS — R91.8 LUNG FIELD ABNORMAL FINDING ON EXAMINATION: ICD-10-CM

## 2021-11-19 PROCEDURE — 71046 X-RAY EXAM CHEST 2 VIEWS: CPT

## 2022-06-01 ENCOUNTER — TELEPHONE (OUTPATIENT)
Dept: INTERNAL MEDICINE | Facility: CLINIC | Age: 72
End: 2022-06-01

## 2022-06-01 DIAGNOSIS — M19.011 PRIMARY OSTEOARTHRITIS OF BOTH SHOULDERS: ICD-10-CM

## 2022-06-01 DIAGNOSIS — G40.909 SEIZURE DISORDER: ICD-10-CM

## 2022-06-01 DIAGNOSIS — R26.81 GAIT INSTABILITY: Primary | ICD-10-CM

## 2022-06-01 DIAGNOSIS — M19.012 PRIMARY OSTEOARTHRITIS OF BOTH SHOULDERS: ICD-10-CM

## 2022-06-01 DIAGNOSIS — R26.89 SHUFFLING GAIT: ICD-10-CM

## 2022-06-01 NOTE — TELEPHONE ENCOUNTER
Caller: Kadeem Reyes    Relationship: Self    Best call back number: [phone]    What is the medical concern/diagnosis: WALKING IS LIMITED, LEG MUSCLES TIGHT, SHUFFLE WHEN WALKING    What specialty or service is being requested: PHYSICAL THERAPY    What is the provider, practice or medical service name: [facility]    What is the office location: [address] [place] [address]    What is the office phone number: [phone]    Any additional details: PATIENT REQUEST A REFERRAL FOR PHYSICAL THERAPY

## 2022-06-02 ENCOUNTER — OFFICE VISIT (OUTPATIENT)
Dept: SLEEP MEDICINE | Facility: HOSPITAL | Age: 72
End: 2022-06-02

## 2022-06-02 VITALS
HEART RATE: 62 BPM | WEIGHT: 215.2 LBS | SYSTOLIC BLOOD PRESSURE: 138 MMHG | BODY MASS INDEX: 26.2 KG/M2 | DIASTOLIC BLOOD PRESSURE: 64 MMHG | OXYGEN SATURATION: 97 % | HEIGHT: 76 IN

## 2022-06-02 DIAGNOSIS — G47.33 OSA ON CPAP: Primary | ICD-10-CM

## 2022-06-02 DIAGNOSIS — Z99.89 OSA ON CPAP: Primary | ICD-10-CM

## 2022-06-02 PROCEDURE — 99213 OFFICE O/P EST LOW 20 MIN: CPT | Performed by: INTERNAL MEDICINE

## 2022-06-02 PROCEDURE — G0463 HOSPITAL OUTPT CLINIC VISIT: HCPCS

## 2022-06-02 NOTE — PROGRESS NOTES
"  [facility]  [address]  [address]  [place] [address]  Phone   Fax       SLEEP CLINIC FOLLOW UP PROGRESS NOTE.    Kadeem Reyes  [id number]   1950  71 y.o.  male      PCP: Kadeem Squires MD      Date of visit: 6/2/2022    Chief Complaint   Patient presents with   • Sleep Apnea       HPI:  This is a 71 y.o. years old patient is here for the management of obstructive sleep apnea.  Sleep apnea is severe in severity with a AHI of 49/hr. Patient is using positive airway pressure therapy with CPAP 6 and the symptoms of snoring, non-restorative sleep and daytime excessive sleepiness have improved significantly on the therapy. Normally goes to bed at 9 PM and wakes up at 6 AM.  The patient wakes up 0 time(s) during the night and has no problem going back to sleep.  Feels refreshed after waking up.  Patient also denies headaches and nasal congestion.     Medications and allergies are reviewed by me and documented in the encounter.     SOCIAL (habits pertaining to sleep medicine)  History tobacco use:No   History of alcohol use: 8 per week  Caffeine use: 0     REVIEW OF SYSTEMS:   Kathryn Sleepiness Scale :Total score: 3   Nasal congestion:No   Dry mouth/nose:No   Post nasal drip; No   Acid reflux/Heartburn:No   Abd bloating:No   Morning headache:No   Anxiety:No   Depression:No    PHYSICAL EXAMINATION:  CONSTITUTIONAL:  Vitals:    06/02/22 1100   BP: 138/64   Pulse: 62   SpO2: 97%   Weight: 97.6 kg (215 lb 3.2 oz)   Height: 193 cm (76\")    Body mass index is 26.19 kg/m².   NOSE: nasal passages are clear, No deformities noted   RESP SYSTEM: Not in any respiratory distress, no chest deformities noted,   CARDIOVASULAR: No edema noted  NEURO: Oriented x 3, gait normal,  Mood and affect appeared appropriate      Data reviewed:  The Smart card downloaded on 6/2/2022 has been reviewed independently by me for compliance and discussed the data with the patient.   Compliance; " 100%  More than 4 hr use, 100%  Average use of the device 8 hours and 16 per night  Residual AHI: 2.6 /hr (goal < 5.0 /hr)  Mask type: Nasal pillow  Device: ResMed  DME: [facility]      ASSESSMENT AND PLAN:  · Obstructive sleep apnea ( G 47.33).  The symptoms of sleep apnea have improved with the device and the treatment.  Patient's compliance with the device is excellent for treatment of sleep apnea.  I have independently reviewed the smart card down load and discussed with the patient the download data and encouarged the patient to continue to use the device.The residual AHI is acceptable. The device is benefiting the patient and the device is medically necessary.  Without proper control of sleep apnea and good compliance there is a increased risk for hypertension, diabetes mellitus and nonrestorative sleep with hypersomnia which can increase risk for motor vehicle accidents.  Untreated sleep apnea is also a risk factor for development of atrial fibrillation, pulmonary hypertension and stroke. The patient is also instructed to get the supplies from the DME company and and change them on a regular basis.  A prescription for supplies has been sent to the DME company.   · Return in about 1 year (around 6/2/2023) for Annual visit with smartcard download. . Patient's questions were answered.      Juan Carlos Erazo MD  Sleep Medicine.  Medical Director, [facility]  6/2/2022 ,

## 2022-06-20 NOTE — TELEPHONE ENCOUNTER
"Ayanna request received for budesonide 3 mg - take 1 cap by mouth daily prn.     See o/v note of 10/26/21: \"#1 microscopic colitis: Currently controlled without medication\"    Call to spouse, Sheri (see hipaa).  States pt did request refill - uses prn, and likes to have on hand in case needed.     Request to DR Richmond.  "

## 2022-06-22 RX ORDER — BUDESONIDE 3 MG/1
CAPSULE, COATED PELLETS ORAL
Qty: 90 CAPSULE | Refills: 1 | Status: SHIPPED | OUTPATIENT
Start: 2022-06-22

## 2022-06-22 NOTE — TELEPHONE ENCOUNTER
Okay for refill on budesonide with 90-day supply and 1 refill.  He needs office follow-up later this year as it will be a year since his last visit this October.  LIG.     **Escribe completed for Budesonide 3 mg - take 1 cap by mouth daily, #90, R1     Call to payton Quinones appt scheduled with DR Yi paula r10/25 @ 10:45 am.  Marjorie Nolan RN.

## 2022-11-01 ENCOUNTER — OFFICE VISIT (OUTPATIENT)
Dept: GASTROENTEROLOGY | Facility: CLINIC | Age: 72
End: 2022-11-01

## 2022-11-01 VITALS
DIASTOLIC BLOOD PRESSURE: 75 MMHG | WEIGHT: 203.4 LBS | BODY MASS INDEX: 24.77 KG/M2 | OXYGEN SATURATION: 98 % | SYSTOLIC BLOOD PRESSURE: 136 MMHG | HEART RATE: 66 BPM | TEMPERATURE: 97.1 F | HEIGHT: 76 IN

## 2022-11-01 DIAGNOSIS — K52.839 MICROSCOPIC COLITIS, UNSPECIFIED MICROSCOPIC COLITIS TYPE: Primary | ICD-10-CM

## 2022-11-01 PROCEDURE — 99213 OFFICE O/P EST LOW 20 MIN: CPT | Performed by: INTERNAL MEDICINE

## 2022-11-01 NOTE — PROGRESS NOTES
Chief Complaint   Patient presents with   • Microscopic Colitis        Kadeem Reyes is a  72 y.o. male here for a follow up visit for microscopic colitis    HPI this 72-year-old white male patient of Dr. Kadeem Squires presents in follow-up since last seen a year ago.  He has a diagnosis of microscopic colitis established in 2012 by Dr. Tona Sweet.  He only uses Entocort or budesonide 3 mg on an intermittent basis and states that may be 4 to 5 weeks between using this medication to control his colitis.  He attributes some of his changes to dietary influences.  We talked about screening evaluation as his last study was in 2012 but he wishes to defer this for this year and we will discuss this again at his next visit in 2023.  I advised him to call in the interim as needed for any GI related complaints.    Past Medical History:   Diagnosis Date   • Benign prostatic hyperplasia without lower urinary tract symptoms 10/28/2019   • Carpal tunnel syndrome on both sides 10/28/2019    Profound presentation with pain,  weakness, and arm pain to shoulder in 2019; improved with injx and braces. S/P hand surgery eval with Dr. Sauceda.    • Gait instability    • History of gout    • Hyponatremia    • Intracranial hemorrhage (HCC)     after fall down steps in ~2003, coma x ~5 weeks   • Macrocytosis without anemia 9/8/2016    B12 normal in 2016; normalized on 2019 labs.     • Microscopic colitis    • DANIEL on CPAP     AHI 49/h   • Osteoarthritis     hands   • Other male erectile dysfunction 10/28/2019   • Peripheral neuropathy    • Seizure disorder (HCC)    • Traumatic brain injury (HCC)     after fall in 2003       Current Outpatient Medications   Medication Sig Dispense Refill   • Budesonide (ENTOCORT EC) 3 MG 24 hr capsule TAKE 1 CAPSULE BY MOUTH EVERY DAY AS NEEDED 90 capsule 1   • calcium carbonate-vitamin d 600-400 MG-UNIT per tablet Take  by mouth.     • Cholecalciferol (VITAMIN D) 1000 UNITS tablet Take  by mouth.     •  "glucosamine sulfate 500 MG capsule capsule Take  by mouth 3 (Three) Times a Day With Meals.     • multivitamin with minerals tablet tablet Take 1 tablet by mouth Daily.     • vitamin C (ASCORBIC ACID) 500 MG tablet Take 500 mg by mouth Daily.     • vitamin E 400 UNIT capsule Take 400 Units by mouth Daily.       No current facility-administered medications for this visit.       PRN Meds:.    Allergies   Allergen Reactions   • Phenytoin        Social History     Socioeconomic History   • Marital status:    • Number of children: 2   Tobacco Use   • Smoking status: Never   • Smokeless tobacco: Never   Vaping Use   • Vaping Use: Never used   Substance and Sexual Activity   • Alcohol use: Yes     Comment: daily 2-3 beers/ daily, \"sometimes nothing' about 2x/ week; 2 akin/ nightly in 2021   • Drug use: No   • Sexual activity: Yes     Partners: Female     Comment: wife only; hx of gonorrhea in youth       Family History   Problem Relation Age of Onset   • Kidney cancer Mother    • Diabetes Father    • Diabetes Sister    • Stroke Sister    • Scoliosis Brother    • No Known Problems Son    • No Known Problems Daughter    • Diabetes Sister    • Leukemia Sister    • No Known Problems Brother        Review of Systems   Constitutional: Negative for activity change, appetite change, fatigue and unexpected weight change.   HENT: Negative for congestion, facial swelling, sore throat, trouble swallowing and voice change.    Eyes: Negative for photophobia and visual disturbance.   Respiratory: Negative for cough and choking.    Cardiovascular: Negative for chest pain.   Gastrointestinal: Negative for abdominal distention, abdominal pain, anal bleeding, blood in stool, constipation, diarrhea, nausea, rectal pain and vomiting.   Endocrine: Negative for polyphagia.   Musculoskeletal: Negative for arthralgias, gait problem and joint swelling.   Skin: Negative for color change, pallor and rash.   Allergic/Immunologic: Negative for " food allergies.   Neurological: Negative for speech difficulty and headaches.   Hematological: Does not bruise/bleed easily.   Psychiatric/Behavioral: Negative for agitation, confusion and sleep disturbance.       Vitals:    11/01/22 1024   BP: 136/75   Pulse: 66   Temp: 97.1 °F (36.2 °C)   SpO2: 98%       Physical Exam  Constitutional:       Appearance: He is well-developed.   HENT:      Head: Normocephalic.   Eyes:      Conjunctiva/sclera: Conjunctivae normal.   Cardiovascular:      Rate and Rhythm: Normal rate and regular rhythm.   Pulmonary:      Breath sounds: Normal breath sounds.   Abdominal:      General: Bowel sounds are normal.      Palpations: Abdomen is soft.   Musculoskeletal:         General: Normal range of motion.      Cervical back: Normal range of motion.   Skin:     General: Skin is warm and dry.   Neurological:      Mental Status: He is alert and oriented to person, place, and time.   Psychiatric:         Behavior: Behavior normal.         ASSESSMENT  #1 microscopic colitis: Managed with only occasional use of Entocort  #2 screening for colorectal cancer      PLAN  Schedule follow-up in 1 year  Patient wishes to defer colonoscopy at this time.      ICD-10-CM ICD-9-CM   1. Microscopic colitis, unspecified microscopic colitis type  K52.839 558.9

## 2022-11-10 DIAGNOSIS — R39.14 FEELING OF INCOMPLETE BLADDER EMPTYING: ICD-10-CM

## 2022-11-10 DIAGNOSIS — I10 HYPERTENSION, UNSPECIFIED TYPE: Primary | ICD-10-CM

## 2022-11-10 DIAGNOSIS — N42.89 PROSTATE ASYMMETRY: ICD-10-CM

## 2022-11-10 DIAGNOSIS — E87.1 HYPONATREMIA: ICD-10-CM

## 2022-11-11 LAB
ALBUMIN SERPL-MCNC: 4.2 G/DL (ref 3.5–5.2)
ALBUMIN/GLOB SERPL: 1.6 G/DL
ALP SERPL-CCNC: 97 U/L (ref 39–117)
ALT SERPL-CCNC: 7 U/L (ref 1–41)
APPEARANCE UR: CLEAR
AST SERPL-CCNC: 16 U/L (ref 1–40)
BACTERIA #/AREA URNS HPF: NORMAL /HPF
BASOPHILS # BLD AUTO: 0.01 10*3/MM3 (ref 0–0.2)
BASOPHILS NFR BLD AUTO: 0.2 % (ref 0–1.5)
BILIRUB SERPL-MCNC: 0.8 MG/DL (ref 0–1.2)
BILIRUB UR QL STRIP: NEGATIVE
BUN SERPL-MCNC: 12 MG/DL (ref 8–23)
BUN/CREAT SERPL: 14.5 (ref 7–25)
CALCIUM SERPL-MCNC: 9.3 MG/DL (ref 8.6–10.5)
CASTS URNS QL MICRO: NORMAL /LPF
CHLORIDE SERPL-SCNC: 91 MMOL/L (ref 98–107)
CHOLEST SERPL-MCNC: 163 MG/DL (ref 0–200)
CO2 SERPL-SCNC: 28.3 MMOL/L (ref 22–29)
COLOR UR: YELLOW
CREAT SERPL-MCNC: 0.83 MG/DL (ref 0.76–1.27)
EGFRCR SERPLBLD CKD-EPI 2021: 93.6 ML/MIN/1.73
EOSINOPHIL # BLD AUTO: 0.06 10*3/MM3 (ref 0–0.4)
EOSINOPHIL NFR BLD AUTO: 1.2 % (ref 0.3–6.2)
EPI CELLS #/AREA URNS HPF: NORMAL /HPF (ref 0–10)
ERYTHROCYTE [DISTWIDTH] IN BLOOD BY AUTOMATED COUNT: 11.9 % (ref 12.3–15.4)
GLOBULIN SER CALC-MCNC: 2.7 GM/DL
GLUCOSE SERPL-MCNC: 98 MG/DL (ref 65–99)
GLUCOSE UR QL STRIP: NEGATIVE
HCT VFR BLD AUTO: 37.7 % (ref 37.5–51)
HDLC SERPL-MCNC: 75 MG/DL (ref 40–60)
HGB BLD-MCNC: 13.3 G/DL (ref 13–17.7)
HGB UR QL STRIP: ABNORMAL
IMM GRANULOCYTES # BLD AUTO: 0.03 10*3/MM3 (ref 0–0.05)
IMM GRANULOCYTES NFR BLD AUTO: 0.6 % (ref 0–0.5)
KETONES UR QL STRIP: NEGATIVE
LDLC SERPL CALC-MCNC: 74 MG/DL (ref 0–100)
LEUKOCYTE ESTERASE UR QL STRIP: NEGATIVE
LYMPHOCYTES # BLD AUTO: 1.42 10*3/MM3 (ref 0.7–3.1)
LYMPHOCYTES NFR BLD AUTO: 27.3 % (ref 19.6–45.3)
MCH RBC QN AUTO: 34.5 PG (ref 26.6–33)
MCHC RBC AUTO-ENTMCNC: 35.3 G/DL (ref 31.5–35.7)
MCV RBC AUTO: 97.9 FL (ref 79–97)
MICRO URNS: ABNORMAL
MONOCYTES # BLD AUTO: 0.71 10*3/MM3 (ref 0.1–0.9)
MONOCYTES NFR BLD AUTO: 13.7 % (ref 5–12)
NEUTROPHILS # BLD AUTO: 2.97 10*3/MM3 (ref 1.7–7)
NEUTROPHILS NFR BLD AUTO: 57 % (ref 42.7–76)
NITRITE UR QL STRIP: NEGATIVE
NRBC BLD AUTO-RTO: 0 /100 WBC (ref 0–0.2)
PH UR STRIP: 7 [PH] (ref 5–7.5)
PLATELET # BLD AUTO: 179 10*3/MM3 (ref 140–450)
POTASSIUM SERPL-SCNC: 4.7 MMOL/L (ref 3.5–5.2)
PROT SERPL-MCNC: 6.9 G/DL (ref 6–8.5)
PROT UR QL STRIP: NEGATIVE
PSA SERPL-MCNC: 2.9 NG/ML (ref 0–4)
RBC # BLD AUTO: 3.85 10*6/MM3 (ref 4.14–5.8)
RBC #/AREA URNS HPF: NORMAL /HPF (ref 0–2)
SODIUM SERPL-SCNC: 128 MMOL/L (ref 136–145)
SP GR UR STRIP: 1.01 (ref 1–1.03)
TRIGL SERPL-MCNC: 76 MG/DL (ref 0–150)
URINALYSIS REFLEX: ABNORMAL
UROBILINOGEN UR STRIP-MCNC: 0.2 MG/DL (ref 0.2–1)
VLDLC SERPL CALC-MCNC: 14 MG/DL (ref 5–40)
WBC # BLD AUTO: 5.2 10*3/MM3 (ref 3.4–10.8)
WBC #/AREA URNS HPF: NORMAL /HPF (ref 0–5)

## 2022-11-17 ENCOUNTER — OFFICE VISIT (OUTPATIENT)
Dept: INTERNAL MEDICINE | Facility: CLINIC | Age: 72
End: 2022-11-17

## 2022-11-17 VITALS
HEART RATE: 65 BPM | TEMPERATURE: 98.5 F | BODY MASS INDEX: 26.29 KG/M2 | DIASTOLIC BLOOD PRESSURE: 62 MMHG | WEIGHT: 215.9 LBS | OXYGEN SATURATION: 99 % | SYSTOLIC BLOOD PRESSURE: 114 MMHG | HEIGHT: 76 IN

## 2022-11-17 DIAGNOSIS — G56.03 CARPAL TUNNEL SYNDROME ON BOTH SIDES: ICD-10-CM

## 2022-11-17 DIAGNOSIS — Z00.01 ENCOUNTER FOR GENERAL ADULT MEDICAL EXAMINATION WITH ABNORMAL FINDINGS: Primary | ICD-10-CM

## 2022-11-17 DIAGNOSIS — G47.33 OSA ON CPAP: ICD-10-CM

## 2022-11-17 DIAGNOSIS — E87.1 HYPONATREMIA: ICD-10-CM

## 2022-11-17 DIAGNOSIS — R26.81 GAIT INSTABILITY: ICD-10-CM

## 2022-11-17 DIAGNOSIS — Z99.89 OSA ON CPAP: ICD-10-CM

## 2022-11-17 DIAGNOSIS — G40.909 SEIZURE DISORDER: ICD-10-CM

## 2022-11-17 DIAGNOSIS — M19.012 PRIMARY OSTEOARTHRITIS OF BOTH SHOULDERS: ICD-10-CM

## 2022-11-17 DIAGNOSIS — K52.839 MICROSCOPIC COLITIS, UNSPECIFIED MICROSCOPIC COLITIS TYPE: ICD-10-CM

## 2022-11-17 DIAGNOSIS — I87.2 VENOUS INSUFFICIENCY OF BOTH LOWER EXTREMITIES: ICD-10-CM

## 2022-11-17 DIAGNOSIS — M19.011 PRIMARY OSTEOARTHRITIS OF BOTH SHOULDERS: ICD-10-CM

## 2022-11-17 DIAGNOSIS — N42.89 PROSTATE ASYMMETRY: ICD-10-CM

## 2022-11-17 DIAGNOSIS — Z23 NEEDS FLU SHOT: ICD-10-CM

## 2022-11-17 DIAGNOSIS — H25.9 AGE-RELATED CATARACT OF BOTH EYES, UNSPECIFIED AGE-RELATED CATARACT TYPE: ICD-10-CM

## 2022-11-17 DIAGNOSIS — L71.9 ROSACEA: ICD-10-CM

## 2022-11-17 DIAGNOSIS — Z00.00 ENCOUNTER FOR SUBSEQUENT ANNUAL WELLNESS VISIT (AWV) IN MEDICARE PATIENT: ICD-10-CM

## 2022-11-17 PROCEDURE — 90662 IIV NO PRSV INCREASED AG IM: CPT | Performed by: INTERNAL MEDICINE

## 2022-11-17 PROCEDURE — 1170F FXNL STATUS ASSESSED: CPT | Performed by: INTERNAL MEDICINE

## 2022-11-17 PROCEDURE — G0008 ADMIN INFLUENZA VIRUS VAC: HCPCS | Performed by: INTERNAL MEDICINE

## 2022-11-17 PROCEDURE — G0439 PPPS, SUBSEQ VISIT: HCPCS | Performed by: INTERNAL MEDICINE

## 2022-11-17 PROCEDURE — 1159F MED LIST DOCD IN RCRD: CPT | Performed by: INTERNAL MEDICINE

## 2022-11-17 PROCEDURE — 99397 PER PM REEVAL EST PAT 65+ YR: CPT | Performed by: INTERNAL MEDICINE

## 2022-11-17 NOTE — PROGRESS NOTES
"Subjective       Chief Complaint   Patient presents with   • Medicare Wellness-subsequent     Review of Medical Issues       HPI:  Kadeem Reyes is a 72 y.o. male RTC in yearly CPE/ AWV, review of medical issues:  Has been doing well overall.   1. CTS B - s/p hand surgery eval with bracing and CT injx. 'it is all gone'.  Shots really seemed to resolve and 'those braces'. Out of braces for 6 months.   2. Varicose veins - new dx in 2019, s/p eval with vascular surgery still using long term compression on L only. Still wearing daily.  No pain issues.   3. Seizure Disorder - occurred after fell down the steps 16 years ago with TBI and coma x 5 weeks.  On meds for > decade, now off with neurology and no return of seizures.    4. TBI - after fall down steps ~16 years ago while intoxicated. In ICU with trach and PEG at time.  Has made near full recovery with only noted mild L sided weakness long term. No falls in last year.   5. Microscopic colitis - dx'd in 2016 after C-scope with Dr. Richmond.   Really only rare sx noted, 'once in a while'.  Diet triggered..  Using budesonide only occasionally.    6. DANIEL - on CPAP nightly. \"Doing good' at 6/2022 visit.   7. Rosacea - LUCIA over year.   8. BPH - minimal sx. Saw urology 3 weeks ago.  PSA was 'way down again'.  Urine flow is OK.     The following portions of the patient's history were reviewed and updated as appropriate: allergies, current medications, past family history, past medical history, past social history, past surgical history and problem list.    Review of Systems   Constitutional: Negative for chills, fever, malaise/fatigue, weight gain and weight loss.   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  L ear uses amplifier. Saw audiology and was cost prohibitive. ). Negative for congestion, odynophagia and sore throat.    Eyes: Negative for discharge, double vision, pain and redness.        Last eye exam ~10/2022; wears glasses  Cataract surgery " needed     Cardiovascular: Negative for chest pain, dyspnea on exertion, irregular heartbeat, leg swelling, near-syncope, palpitations and syncope.   Respiratory: Negative for cough, shortness of breath, sleep disturbances due to breathing (on CPAP) and snoring.    Endocrine: Negative for polydipsia, polyphagia and polyuria.   Hematologic/Lymphatic: Negative for bleeding problem. Does not bruise/bleed easily.   Skin: Negative for rash and suspicious lesions.   Musculoskeletal: Positive for stiffness ('tight legs' ). Negative for joint pain, joint swelling, muscle cramps, muscle weakness and myalgias.   Gastrointestinal: Negative for constipation, diarrhea, dysphagia, heartburn, nausea and vomiting.   Genitourinary: Negative for dysuria, frequency, hematuria, hesitancy and incomplete emptying.   Neurological: Negative for dizziness, headaches, light-headedness, seizures and tremors.   Psychiatric/Behavioral: Negative for depression. The patient does not have insomnia and is not nervous/anxious.    Allergic/Immunologic: Negative for environmental allergies and persistent infections.       Patient Care Team:  Kadeem Squires MD as PCP - General (Internal Medicine)    Recent Hospitalizations: No recent hospitalization(s).    Depression Screen:   PHQ-2/PHQ-9 Depression Screening 11/17/2022   Retired PHQ-9 Total Score -   Retired Total Score -   Little Interest or Pleasure in Doing Things 0-->not at all   Feeling Down, Depressed or Hopeless 0-->not at all   PHQ-9: Brief Depression Severity Measure Score 0       Functional and Cognitive Screening:  Functional & Cognitive Status 11/17/2022   Do you have difficulty preparing food and eating? No   Do you have difficulty bathing yourself, getting dressed or grooming yourself? No   Do you have difficulty using the toilet? No   Do you have difficulty moving around from place to place? No   Do you have trouble with steps or getting out of a bed or a chair? No   Current Diet Well  "Balanced Diet   Dental Exam Up to date        Dental Exam Comment -   Eye Exam Up to date        Eye Exam Comment -   Exercise (times per week) 2 times per week   Current Exercises Include Walking   Current Exercise Activities Include -   Do you need help using the phone?  No   Are you deaf or do you have serious difficulty hearing?  Yes   Do you need help with transportation? No   Do you need help shopping? No   Do you need help preparing meals?  No   Do you need help with housework?  No   Do you need help with laundry? No   Do you need help taking your medications? No   Do you need help managing money? No   Do you ever drive or ride in a car without wearing a seat belt? No   Have you felt unusual stress, anger or loneliness in the last month? No   Who do you live with? Spouse   If you need help, do you have trouble finding someone available to you? No   Have you been bothered in the last four weeks by sexual problems? No   Do you have difficulty concentrating, remembering or making decisions? No       Does the patient have evidence of cognitive impairment? no    Does not need ASA (and currently is not on it)    Vitals:    11/17/22 1015   BP: 114/62   BP Location: Left arm   Patient Position: Sitting   Cuff Size: Adult   Pulse: 65   Temp: 98.5 °F (36.9 °C)   TempSrc: Oral   SpO2: 99%   Weight: 97.9 kg (215 lb 14.4 oz)   Height: 193 cm (76\")       Body mass index is 26.28 kg/m².    Visual Acuity:  No results found.    Advanced Care Planning:  ACP discussion was held with the patient during this visit. Patient has an advance directive (not in EMR), copy requested.    Objective   Physical Exam  Vitals reviewed.   Constitutional:       General: He is not in acute distress.     Appearance: Normal appearance. He is well-developed. He is not ill-appearing or toxic-appearing.   HENT:      Head: Normocephalic and atraumatic.      Right Ear: Tympanic membrane, ear canal and external ear normal. Decreased hearing noted.      " Left Ear: Tympanic membrane, ear canal and external ear normal.      Nose: Nose normal.      Mouth/Throat:      Mouth: Mucous membranes are moist. No oral lesions.      Pharynx: Oropharynx is clear. Uvula midline. No pharyngeal swelling, oropharyngeal exudate, posterior oropharyngeal erythema or uvula swelling.   Eyes:      General: Lids are normal. No scleral icterus.        Right eye: No discharge.         Left eye: No discharge.      Extraocular Movements: Extraocular movements intact.      Conjunctiva/sclera: Conjunctivae normal.      Pupils: Pupils are equal, round, and reactive to light.   Neck:      Thyroid: No thyroid mass or thyromegaly.      Vascular: No carotid bruit.      Trachea: Tracheostomy (well healed, scarred) present. No tracheal tenderness or tracheal deviation.   Cardiovascular:      Rate and Rhythm: Normal rate and regular rhythm.      Pulses:           Radial pulses are 2+ on the right side and 2+ on the left side.        Dorsalis pedis pulses are 2+ on the right side and 2+ on the left side.        Posterior tibial pulses are 2+ on the right side and 2+ on the left side.      Heart sounds: Normal heart sounds, S1 normal and S2 normal. No murmur heard.    No friction rub. No gallop.   Pulmonary:      Effort: Pulmonary effort is normal. No respiratory distress.      Breath sounds: Normal breath sounds. No wheezing, rhonchi or rales.   Abdominal:      General: Bowel sounds are normal. There is no distension.      Palpations: Abdomen is soft. There is no mass.      Tenderness: There is no abdominal tenderness. There is no guarding or rebound.      Hernia: There is no hernia in the left inguinal area.   Musculoskeletal:      Right shoulder: Crepitus present. Decreased range of motion (rotational, mild loss). Normal strength.      Left shoulder: Crepitus present. Decreased range of motion (rotational, mild loss). Normal strength.      Cervical back: Full passive range of motion without pain, normal  range of motion and neck supple. No rigidity. No pain with movement or muscular tenderness.      Right hip: No bony tenderness or crepitus. Decreased range of motion. Normal strength.      Left hip: No bony tenderness or crepitus. Decreased range of motion. Normal strength.      Right lower leg: No edema.      Left lower leg: No edema.   Lymphadenopathy:      Cervical: No cervical adenopathy.   Skin:     General: Skin is warm and dry.      Findings: No rash.   Neurological:      Mental Status: He is alert and oriented to person, place, and time.      Cranial Nerves: No cranial nerve deficit, dysarthria or facial asymmetry.      Sensory: No sensory deficit.      Motor: No weakness, tremor, atrophy or abnormal muscle tone.      Gait: Gait abnormal (slow, slight shuffle to gait).      Deep Tendon Reflexes: Reflexes are normal and symmetric.      Reflex Scores:       Patellar reflexes are 2+ on the right side and 2+ on the left side.       Achilles reflexes are 2+ on the right side and 2+ on the left side.  Psychiatric:         Attention and Perception: Attention normal.         Mood and Affect: Mood normal.         Speech: Speech normal.         Behavior: Behavior normal. Behavior is cooperative.         Thought Content: Thought content normal.         Compared to one year ago, the patient feels physical health is the same.  Compared to one year ago, the patient feels mental health is the same.    Reviewed chart for potential of high risk medication in the elderly: yes  Reviewed chart for potential of harmful drug interactions in the elderly:yes    Identification of Risk Factors:  Risk factors include: Advance Directive Discussion  Cardiovascular risk  Colon Cancer Screening  Fall Risk  Hearing Problem  Immunizations Discussed/Encouraged (specific immunizations; Tdap, Hepatitis A Vaccine/Series, Influenza, Pneumococcal 23, Shingrix and COVID19 )  Prostate Cancer Screening .    Patient Self-Management and Personalized  Health Advice  The patient has been provided with information about: diet, exercise, weight management and fall prevention and preventive services including:   · Annual Wellness Visit (AWV)  · Colorectal Cancer Screening, Colonoscopy  · Influenza Vaccine and Administration  · Prostate Cancer Screening .    Discussed the patient's BMI with him. The BMI is in the acceptable range.    Assessment & Plan   Diagnoses and all orders for this visit:    1. Encounter for general adult medical examination with abnormal findings (Primary)    2. Encounter for subsequent annual wellness visit (AWV) in Medicare patient    3. Venous insufficiency of both lower extremities    4. Seizure disorder (HCC)    5. Rosacea    6. Prostate asymmetry    7. Primary osteoarthritis of both shoulders    8. DANIEL on CPAP    9. Microscopic colitis, unspecified microscopic colitis type    10. Hyponatremia    11. Gait instability    12. Carpal tunnel syndrome on both sides    13. Age-related cataract of both eyes, unspecified age-related cataract type    14. Needs flu shot  -     Fluzone High-Dose 65+yrs            Diagnoses and all orders for this visit:    Encounter for general adult medical examination with abnormal findings    Encounter for subsequent annual wellness visit (AWV) in Medicare patient    Venous insufficiency of both lower extremities    Seizure disorder (HCC)    Rosacea    Prostate asymmetry    Primary osteoarthritis of both shoulders    DANIEL on CPAP    Microscopic colitis, unspecified microscopic colitis type    Hyponatremia    Gait instability    Carpal tunnel syndrome on both sides    Age-related cataract of both eyes, unspecified age-related cataract type    Needs flu shot  -     Fluzone High-Dose 65+yrs    Other orders  -     Calcium Citrate (CITRACAL PO); Take 2 tablets by mouth Daily.        Kadeem Reyes is a 72 y.o. male RTC in yearly CPE/ AWV, review of medical issues:    1. CTS B - s/p hand surgery eval with bracing and CT  injx. Resolved. F/U hand surgery PRN.  2. Varicose veins - new dx in 2019, s/p eval with vascular surgery still using long term compression on L only. No pain issues. Monitor.   3. Seizure Disorder - occurred after fell down the steps 16 years ago with TBI and coma x 5 weeks.  On meds for > decade, now off with neurology and no return of seizures.    Neuro f/u PRN at this time after neuro retired/ no sx.   4. TBI - after fall down steps ~16 years ago while intoxicated. In ICU with trach and PEG at time.  Has made near full recovery with only noted mild L sided weakness long term. ETOH reduction continues to be advised.   --> Slow gait - very notable on exam, present for > 5 years with no tremor or s/sx of Parkinson's.  Responding to PT with strength and gait, but will with tight muscles in legs (evident on exam).  C/W PT.   5. Microscopic colitis - dx'd in 2016 after C-scope with Dr. Richmond.  UTD 11/2022 GI, plan for PRN budesonide for recurrent sx.   6. DANIEL - on CPAP nightly. [facility] sleep med 6/2022, good compliance noted.   7. Rosacea - LUCIA.  8. Hyponatremia - chronic since TBI, per pt.  Stable on labs. Monitor.    9. BPH - minimal sx. UTD urology ~10/2022, PSA remains low on labs.  Sildenafil PRN.  10. HM - labs d/w pt; Flu - today; Pvax/ Prevnar/ COVID/ Shingrix - UTD; Tdap/ COVID updated booster - at pharmacy; C-scope 3/2012 (-) --> deferred to 2023 per d/w GI; MITCH/ PSA per urology;  Hep C Ab (-) 12/2016; Preventative care plan provided to pt at end of visit; Need living will copy.    Return in about 1 year (around 11/17/2023) for Annual physical, Medicare Wellness.           Current Outpatient Medications:   •  Calcium Citrate (CITRACAL PO), Take 2 tablets by mouth Daily., Disp: , Rfl:   •  Cholecalciferol (VITAMIN D) 1000 UNITS tablet, Take  by mouth., Disp: , Rfl:   •  glucosamine sulfate 500 MG capsule capsule, Take  by mouth 3 (Three) Times a Day With Meals., Disp: , Rfl:   •  multivitamin with minerals  tablet tablet, Take 1 tablet by mouth Daily., Disp: , Rfl:   •  vitamin C (ASCORBIC ACID) 500 MG tablet, Take 500 mg by mouth Daily., Disp: , Rfl:   •  vitamin E 400 UNIT capsule, Take 400 Units by mouth Daily., Disp: , Rfl:   •  Budesonide (ENTOCORT EC) 3 MG 24 hr capsule, TAKE 1 CAPSULE BY MOUTH EVERY DAY AS NEEDED, Disp: 90 capsule, Rfl: 1

## 2022-11-17 NOTE — PATIENT INSTRUCTIONS
Medicare Wellness  Personal Prevention Plan of Service     Date of Office Visit:    Encounter Provider:  Kadeem Squires MD  Place of Service:  [facility]  Patient Name: Kadeem Reyes  :  1950    As part of the Medicare Wellness portion of your visit today, we are providing you with this personalized preventive plan of services (PPPS). This plan is based upon recommendations of the [place] Preventive Services Task Force (USPSTF) and the Advisory Committee on Immunization Practices (ACIP).    This lists the preventive care services that should be considered, and provides dates of when you are due. Items listed as completed are up-to-date and do not require any further intervention.    Health Maintenance   Topic Date Due    TDAP/TD VACCINES (2 - Td or Tdap) 2021    COVID-19 Vaccine (4 - Booster for Pfizer series) 2022    COLORECTAL CANCER SCREENING  2022    ANNUAL WELLNESS VISIT  2023    HEPATITIS C SCREENING  Completed    INFLUENZA VACCINE  Completed    Pneumococcal Vaccine 65+  Completed    ZOSTER VACCINE  Completed       Orders Placed This Encounter   Procedures    Fluzone High-Dose 65+yrs       Return in about 1 year (around 2023) for Annual physical, Medicare Wellness.

## 2023-06-15 ENCOUNTER — OFFICE VISIT (OUTPATIENT)
Dept: SLEEP MEDICINE | Facility: HOSPITAL | Age: 73
End: 2023-06-15
Payer: MEDICARE

## 2023-06-15 VITALS
DIASTOLIC BLOOD PRESSURE: 54 MMHG | BODY MASS INDEX: 25.94 KG/M2 | HEART RATE: 69 BPM | HEIGHT: 76 IN | WEIGHT: 213 LBS | SYSTOLIC BLOOD PRESSURE: 137 MMHG | OXYGEN SATURATION: 99 %

## 2023-06-15 DIAGNOSIS — Z99.89 OSA ON CPAP: Primary | ICD-10-CM

## 2023-06-15 DIAGNOSIS — G47.33 OSA ON CPAP: Primary | ICD-10-CM

## 2023-06-15 PROCEDURE — G0463 HOSPITAL OUTPT CLINIC VISIT: HCPCS

## 2023-06-15 NOTE — PROGRESS NOTES
"  [facility]  [address]  [address]  [place] [address]  Phone   Fax       SLEEP CLINIC FOLLOW UP PROGRESS NOTE.    Kadeem Reyes  [id number]   1950  72 y.o.  male      PCP: Kadeem Squires MD      Date of visit: 6/15/2023    Chief Complaint   Patient presents with    Sleep Apnea       HPI:  This is a 72 y.o. years old patient is here for the management of obstructive sleep apnea.  Sleep apnea is severe in severity with a AHI of 49/hr. Patient is using positive airway pressure therapy with auto CPAP and the symptoms of sleep apnea have improved significantly on the therapy. Normally patient goes to bed at 9 PM and wakes up at 6 AM .  The patient wakes up 2 time(s) during the night and has no problem going back to sleep.  Feels refreshed after waking up.  Patient is having hip problems and is is it is painful and stiff and has a limp walking and limitation.  However he tells me that he is going to be seeing orthopedics in few weeks.    Medications and allergies are reviewed by me and documented in the encounter.     SOCIAL (habits pertaining to sleep medicine)  History tobacco use:No   History of alcohol use: 10 per week  Caffeine use: 1     REVIEW OF SYSTEMS:   Pertaining positive symptoms are:  [place] Sleepiness Scale :Total score: 4   Hip pain        PHYSICAL EXAMINATION:  CONSTITUTIONAL:  Vitals:    06/15/23 0900   BP: 137/54   Pulse: 69   SpO2: 99%   Weight: 96.6 kg (213 lb)   Height: 193 cm (75.98\")    Body mass index is 25.94 kg/m².   NOSE: nasal passages are clear, No deformities noted   RESP SYSTEM: Not in any respiratory distress, no chest deformities noted,   CARDIOVASULAR: No edema noted  NEURO: Oriented x 3, gait normal,  Mood and affect appeared appropriate      Data reviewed:  The Smart card downloaded on 6/15/2023 has been reviewed independently by me for compliance and discussed the data with the patient.   Compliance; 100%  More than 4 hr use, " 97%  Average use of the device 7 hours and 46 minutes per night  Residual AHI: 4 /hr (goal < 5.0 /hr)  Mask type: Nasal pillows  Device: ResMed  DME: [facility]      ASSESSMENT AND PLAN:  Obstructive sleep apnea ( G 47.33).  The symptoms of sleep apnea have improved with the device and the treatment.  Patient's compliance with the device is excellent for treatment of sleep apnea.  I have independently reviewed the smart card down load and discussed with the patient the download data and encouarged the patient to continue to use the device.The residual AHI is acceptable. The device is benefiting the patient and the device is medically necessary.  Without proper control of sleep apnea and good compliance there is a increased risk for hypertension, diabetes mellitus and nonrestorative sleep with hypersomnia which can increase risk for motor vehicle accidents.  Untreated sleep apnea is also a risk factor for development of atrial fibrillation, pulmonary hypertension, insulin resistance and stroke. The patient is also instructed to get the supplies from the DME company and and change them on a regular basis.  A prescription for supplies has been sent to the DME company.  I have also discussed the good sleep hygiene habits and adequate amount of sleep needed for good health.  Return in about 1 year (around 6/15/2024) for with smart card down load. . Patient's questions were answered.    6/15/2023  Juan Carlos Erazo MD  Sleep Medicine.  Medical Director,   [facility] sleep centers.

## 2023-10-31 ENCOUNTER — TELEPHONE (OUTPATIENT)
Dept: GASTROENTEROLOGY | Facility: CLINIC | Age: 73
End: 2023-10-31
Payer: MEDICARE

## 2023-11-28 ENCOUNTER — OFFICE VISIT (OUTPATIENT)
Dept: GASTROENTEROLOGY | Facility: CLINIC | Age: 73
End: 2023-11-28
Payer: MEDICARE

## 2023-11-28 VITALS
SYSTOLIC BLOOD PRESSURE: 150 MMHG | HEIGHT: 76 IN | WEIGHT: 218.2 LBS | OXYGEN SATURATION: 99 % | TEMPERATURE: 97.6 F | BODY MASS INDEX: 26.57 KG/M2 | DIASTOLIC BLOOD PRESSURE: 81 MMHG | HEART RATE: 71 BPM

## 2023-11-28 DIAGNOSIS — Z12.12 SCREENING FOR COLORECTAL CANCER: ICD-10-CM

## 2023-11-28 DIAGNOSIS — K52.839 MICROSCOPIC COLITIS, UNSPECIFIED MICROSCOPIC COLITIS TYPE: Primary | ICD-10-CM

## 2023-11-28 DIAGNOSIS — Z12.11 SCREENING FOR COLORECTAL CANCER: ICD-10-CM

## 2023-11-28 NOTE — PROGRESS NOTES
Chief Complaint   Patient presents with    Microscopic colitis        Kadeem Reyes is a  73 y.o. male here for a follow up visit for microscopic colitis    HPI this 73-year-old white male patient of Dr. Kadeem Squires presents in follow-up since last seen in November 2022.  He carries a history of microscopic colitis first established in 2012.  He has been using budesonide on an intermittent basis and states he is only had occasion to use this 5 or 6 times within the last year.  Otherwise he has pretty normal bowel function.  We talked about the fact that he is due for screening evaluation of his colon and I offered colonoscopic examination at this time.  He is going to consider this option and will call with his decision.  Otherwise we will maintain an annual follow-up.    Past Medical History:   Diagnosis Date    Benign prostatic hyperplasia without lower urinary tract symptoms 10/28/2019    Carpal tunnel syndrome on both sides 10/28/2019    Profound presentation with pain,  weakness, and arm pain to shoulder in 2019; improved with injx and braces. S/P hand surgery eval with Dr. Sauceda.     Gait instability     History of gout     Hyponatremia     Intracranial hemorrhage     after fall down steps in ~2003, coma x ~5 weeks    Macrocytosis without anemia 9/8/2016    B12 normal in 2016; normalized on 2019 labs.      Microscopic colitis     DANIEL on CPAP     AHI 49/h    Osteoarthritis     hands    Other male erectile dysfunction 10/28/2019    Peripheral neuropathy     Seizure disorder     Traumatic brain injury     after fall in 2003       Current Outpatient Medications   Medication Sig Dispense Refill    Budesonide (ENTOCORT EC) 3 MG 24 hr capsule TAKE 1 CAPSULE BY MOUTH EVERY DAY AS NEEDED 90 capsule 1    Calcium Citrate (CITRACAL PO) Take 2 tablets by mouth Daily.      Cholecalciferol (VITAMIN D) 1000 UNITS tablet Take  by mouth.      glucosamine sulfate 500 MG capsule capsule Take  by mouth 3 (Three) Times a Day  "With Meals.      vitamin C (ASCORBIC ACID) 500 MG tablet Take 1 tablet by mouth Daily.      vitamin E 400 UNIT capsule Take 1 capsule by mouth Daily.      multivitamin with minerals tablet tablet Take 1 tablet by mouth Daily. (Patient not taking: Reported on 11/28/2023)       No current facility-administered medications for this visit.       PRN Meds:.    Allergies   Allergen Reactions    Phenytoin        Social History     Socioeconomic History    Marital status:     Number of children: 2   Tobacco Use    Smoking status: Never    Smokeless tobacco: Never   Vaping Use    Vaping Use: Never used   Substance and Sexual Activity    Alcohol use: Yes     Comment: daily 2-3 beers/ daily, \"sometimes nothing' about 2x/ week; 2 akin/ 5 nights per week in [id number]    Drug use: No    Sexual activity: Yes     Partners: Female     Comment: wife only; hx of gonorrhea in youth       Family History   Problem Relation Age of Onset    Kidney cancer Mother     Diabetes Father     Diabetes Sister     Stroke Sister     Scoliosis Brother     No Known Problems Son     No Known Problems Daughter     Diabetes Sister     Leukemia Sister     No Known Problems Brother        Review of Systems   Constitutional:  Negative for activity change, appetite change, fatigue and unexpected weight change.   HENT:  Negative for congestion, facial swelling, sore throat, trouble swallowing and voice change.    Eyes:  Negative for photophobia and visual disturbance.   Respiratory:  Negative for cough and choking.    Cardiovascular:  Negative for chest pain.   Gastrointestinal:  Negative for abdominal distention, abdominal pain, anal bleeding, blood in stool, constipation, diarrhea, nausea, rectal pain and vomiting.   Endocrine: Negative for polyphagia.   Musculoskeletal:  Negative for arthralgias, gait problem and joint swelling.   Skin:  Negative for color change, pallor and rash.   Allergic/Immunologic: Negative for food allergies.   Neurological:  " Negative for speech difficulty and headaches.   Hematological:  Does not bruise/bleed easily.   Psychiatric/Behavioral:  Negative for agitation, confusion and sleep disturbance.        Vitals:    11/28/23 0908   BP: 150/81   Pulse: 71   Temp: 97.6 °F (36.4 °C)   SpO2: 99%       Physical Exam  Musculoskeletal:      Comments: Uses a cane for ambulation         ASSESSMENT   #1 microscopic colitis: Stable with occasional use of budesonide  #2 screening for colorectal cancer      PLAN  Offer colonoscopy  Continue current medical regimen  Office follow-up in 1 year      ICD-10-CM ICD-9-CM   1. Microscopic colitis, unspecified microscopic colitis type  K52.839 558.9

## 2023-12-14 DIAGNOSIS — M19.012 PRIMARY OSTEOARTHRITIS OF BOTH SHOULDERS: ICD-10-CM

## 2023-12-14 DIAGNOSIS — N42.89 PROSTATE ASYMMETRY: ICD-10-CM

## 2023-12-14 DIAGNOSIS — Z12.5 SCREENING FOR PROSTATE CANCER: ICD-10-CM

## 2023-12-14 DIAGNOSIS — E87.1 HYPONATREMIA: ICD-10-CM

## 2023-12-14 DIAGNOSIS — K52.839 MICROSCOPIC COLITIS, UNSPECIFIED MICROSCOPIC COLITIS TYPE: Primary | ICD-10-CM

## 2023-12-14 DIAGNOSIS — E78.5 ELEVATED LIPIDS: ICD-10-CM

## 2023-12-14 DIAGNOSIS — M19.011 PRIMARY OSTEOARTHRITIS OF BOTH SHOULDERS: ICD-10-CM

## 2023-12-19 LAB
ALBUMIN SERPL-MCNC: 4.3 G/DL (ref 3.5–5.2)
ALBUMIN/GLOB SERPL: 1.5 G/DL
ALP SERPL-CCNC: 83 U/L (ref 39–117)
ALT SERPL-CCNC: 17 U/L (ref 1–41)
APPEARANCE UR: CLEAR
AST SERPL-CCNC: 17 U/L (ref 1–40)
BACTERIA #/AREA URNS HPF: NORMAL /HPF
BASOPHILS # BLD AUTO: 0.02 10*3/MM3 (ref 0–0.2)
BASOPHILS NFR BLD AUTO: 0.5 % (ref 0–1.5)
BILIRUB SERPL-MCNC: 0.7 MG/DL (ref 0–1.2)
BILIRUB UR QL STRIP: NEGATIVE
BUN SERPL-MCNC: 12 MG/DL (ref 8–23)
BUN/CREAT SERPL: 11.9 (ref 7–25)
CALCIUM SERPL-MCNC: 9.4 MG/DL (ref 8.6–10.5)
CASTS URNS QL MICRO: NORMAL /LPF
CHLORIDE SERPL-SCNC: 98 MMOL/L (ref 98–107)
CHOLEST SERPL-MCNC: 168 MG/DL (ref 0–200)
CO2 SERPL-SCNC: 27.4 MMOL/L (ref 22–29)
COLOR UR: YELLOW
CREAT SERPL-MCNC: 1.01 MG/DL (ref 0.76–1.27)
EGFRCR SERPLBLD CKD-EPI 2021: 79 ML/MIN/1.73
EOSINOPHIL # BLD AUTO: 0.05 10*3/MM3 (ref 0–0.4)
EOSINOPHIL NFR BLD AUTO: 1.3 % (ref 0.3–6.2)
EPI CELLS #/AREA URNS HPF: NORMAL /HPF (ref 0–10)
ERYTHROCYTE [DISTWIDTH] IN BLOOD BY AUTOMATED COUNT: 12.3 % (ref 12.3–15.4)
GLOBULIN SER CALC-MCNC: 2.8 GM/DL
GLUCOSE SERPL-MCNC: 86 MG/DL (ref 65–99)
GLUCOSE UR QL STRIP: NEGATIVE
HCT VFR BLD AUTO: 37.9 % (ref 37.5–51)
HDLC SERPL-MCNC: 77 MG/DL (ref 40–60)
HGB BLD-MCNC: 13.7 G/DL (ref 13–17.7)
HGB UR QL STRIP: NEGATIVE
IMM GRANULOCYTES # BLD AUTO: 0.01 10*3/MM3 (ref 0–0.05)
IMM GRANULOCYTES NFR BLD AUTO: 0.3 % (ref 0–0.5)
KETONES UR QL STRIP: NEGATIVE
LDLC SERPL CALC-MCNC: 79 MG/DL (ref 0–100)
LEUKOCYTE ESTERASE UR QL STRIP: NEGATIVE
LYMPHOCYTES # BLD AUTO: 1.12 10*3/MM3 (ref 0.7–3.1)
LYMPHOCYTES NFR BLD AUTO: 28.1 % (ref 19.6–45.3)
MCH RBC QN AUTO: 35.5 PG (ref 26.6–33)
MCHC RBC AUTO-ENTMCNC: 36.1 G/DL (ref 31.5–35.7)
MCV RBC AUTO: 98.2 FL (ref 79–97)
MICRO URNS: ABNORMAL
MICRO URNS: ABNORMAL
MONOCYTES # BLD AUTO: 0.74 10*3/MM3 (ref 0.1–0.9)
MONOCYTES NFR BLD AUTO: 18.5 % (ref 5–12)
NEUTROPHILS # BLD AUTO: 2.05 10*3/MM3 (ref 1.7–7)
NEUTROPHILS NFR BLD AUTO: 51.3 % (ref 42.7–76)
NITRITE UR QL STRIP: NEGATIVE
NRBC BLD AUTO-RTO: 0 /100 WBC (ref 0–0.2)
PH UR STRIP: 8 [PH] (ref 5–7.5)
PLATELET # BLD AUTO: 166 10*3/MM3 (ref 140–450)
POTASSIUM SERPL-SCNC: 4.9 MMOL/L (ref 3.5–5.2)
PROT SERPL-MCNC: 7.1 G/DL (ref 6–8.5)
PROT UR QL STRIP: NEGATIVE
PSA SERPL-MCNC: 2.97 NG/ML (ref 0–4)
RBC # BLD AUTO: 3.86 10*6/MM3 (ref 4.14–5.8)
RBC #/AREA URNS HPF: NORMAL /HPF (ref 0–2)
SODIUM SERPL-SCNC: 135 MMOL/L (ref 136–145)
SP GR UR STRIP: 1.01 (ref 1–1.03)
TRIGL SERPL-MCNC: 58 MG/DL (ref 0–150)
TSH SERPL DL<=0.005 MIU/L-ACNC: 3.61 UIU/ML (ref 0.27–4.2)
URINALYSIS REFLEX: ABNORMAL
UROBILINOGEN UR STRIP-MCNC: 0.2 MG/DL (ref 0.2–1)
VLDLC SERPL CALC-MCNC: 12 MG/DL (ref 5–40)
WBC # BLD AUTO: 3.99 10*3/MM3 (ref 3.4–10.8)
WBC #/AREA URNS HPF: NORMAL /HPF (ref 0–5)

## 2023-12-22 ENCOUNTER — OFFICE VISIT (OUTPATIENT)
Dept: INTERNAL MEDICINE | Facility: CLINIC | Age: 73
End: 2023-12-22
Payer: MEDICARE

## 2023-12-22 VITALS
WEIGHT: 216 LBS | OXYGEN SATURATION: 100 % | SYSTOLIC BLOOD PRESSURE: 140 MMHG | DIASTOLIC BLOOD PRESSURE: 82 MMHG | TEMPERATURE: 98 F | HEART RATE: 78 BPM | HEIGHT: 76 IN | BODY MASS INDEX: 26.3 KG/M2

## 2023-12-22 DIAGNOSIS — K52.839 MICROSCOPIC COLITIS, UNSPECIFIED MICROSCOPIC COLITIS TYPE: ICD-10-CM

## 2023-12-22 DIAGNOSIS — G40.909 SEIZURE DISORDER: ICD-10-CM

## 2023-12-22 DIAGNOSIS — G47.33 OSA ON CPAP: ICD-10-CM

## 2023-12-22 DIAGNOSIS — N52.8 OTHER MALE ERECTILE DYSFUNCTION: ICD-10-CM

## 2023-12-22 DIAGNOSIS — Z00.01 ENCOUNTER FOR GENERAL ADULT MEDICAL EXAMINATION WITH ABNORMAL FINDINGS: ICD-10-CM

## 2023-12-22 DIAGNOSIS — M19.011 PRIMARY OSTEOARTHRITIS OF BOTH SHOULDERS: ICD-10-CM

## 2023-12-22 DIAGNOSIS — M19.012 PRIMARY OSTEOARTHRITIS OF BOTH SHOULDERS: ICD-10-CM

## 2023-12-22 DIAGNOSIS — R26.81 GAIT INSTABILITY: ICD-10-CM

## 2023-12-22 DIAGNOSIS — H25.9 AGE-RELATED CATARACT OF BOTH EYES, UNSPECIFIED AGE-RELATED CATARACT TYPE: ICD-10-CM

## 2023-12-22 DIAGNOSIS — G56.03 CARPAL TUNNEL SYNDROME ON BOTH SIDES: ICD-10-CM

## 2023-12-22 DIAGNOSIS — I87.2 VENOUS INSUFFICIENCY OF BOTH LOWER EXTREMITIES: ICD-10-CM

## 2023-12-22 DIAGNOSIS — L71.9 ROSACEA: ICD-10-CM

## 2023-12-22 DIAGNOSIS — D75.89 MACROCYTOSIS WITHOUT ANEMIA: ICD-10-CM

## 2023-12-22 DIAGNOSIS — Z00.00 ENCOUNTER FOR SUBSEQUENT ANNUAL WELLNESS VISIT (AWV) IN MEDICARE PATIENT: Primary | ICD-10-CM

## 2023-12-22 DIAGNOSIS — E87.1 HYPONATREMIA: ICD-10-CM

## 2023-12-22 DIAGNOSIS — N42.89 PROSTATE ASYMMETRY: ICD-10-CM

## 2023-12-22 DIAGNOSIS — Z23 NEED FOR COVID-19 VACCINE: ICD-10-CM

## 2023-12-22 DIAGNOSIS — Z23 NEEDS FLU SHOT: ICD-10-CM

## 2023-12-22 DIAGNOSIS — H90.A31 MIXED CONDUCTIVE AND SENSORINEURAL HEARING LOSS OF RIGHT EAR WITH RESTRICTED HEARING OF LEFT EAR: ICD-10-CM

## 2023-12-22 NOTE — PATIENT INSTRUCTIONS
Complete Tdap at pharmacy    Medicare Wellness  Personal Prevention Plan of Service     Date of Office Visit:    Encounter Provider:  Kadeem Squires MD  Place of Service:  [facility]  Patient Name: Kadeem Reyes  :  1950    As part of the Medicare Wellness portion of your visit today, we are providing you with this personalized preventive plan of services (PPPS). This plan is based upon recommendations of the [place] Preventive Services Task Force (USPSTF) and the Advisory Committee on Immunization Practices (ACIP).    This lists the preventive care services that should be considered, and provides dates of when you are due. Items listed as completed are up-to-date and do not require any further intervention.    Health Maintenance   Topic Date Due    TDAP/TD VACCINES (2 - Td or Tdap) 2021    COLORECTAL CANCER SCREENING  2022    ANNUAL WELLNESS VISIT  2024    BMI FOLLOWUP  2024    HEPATITIS C SCREENING  Completed    COVID-19 Vaccine  Completed    INFLUENZA VACCINE  Completed    Pneumococcal Vaccine 65+  Completed    ZOSTER VACCINE  Completed       Orders Placed This Encounter   Procedures    Fluzone High-Dose 65+yrs    COVID-19 F23 (Pfizer) 12yrs+ (COMIRNATY)       Return in about 1 year (around 2024) for Medicare Wellness.

## 2023-12-22 NOTE — PROGRESS NOTES
Subjective   Kadeem Reyes is a 73 y.o. male who presents for a Medicare Well Visit    Patient Care Team:  Kadeem Squires MD as PCP - General (Internal Medicine)    Recent Hospitalizations: No recent hospitalization(s).    Depression Screen:       2023     1:11 PM   PHQ-2/PHQ-9 Depression Screening   Little Interest or Pleasure in Doing Things 0-->not at all   Feeling Down, Depressed or Hopeless 0-->not at all   PHQ-9: Brief Depression Severity Measure Score 0       Functional and Cognitive Screenin/22/2023     1:09 PM   Functional & Cognitive Status   Do you have difficulty preparing food and eating? No   Do you have difficulty bathing yourself, getting dressed or grooming yourself? No   Do you have difficulty using the toilet? No   Do you have difficulty moving around from place to place? No   Do you have trouble with steps or getting out of a bed or a chair? No   Current Diet Well Balanced Diet   Dental Exam Up to date   Eye Exam Up to date   Exercise (times per week) 0 times per week   Current Exercises Include No Regular Exercise   Do you need help using the phone?  No   Are you deaf or do you have serious difficulty hearing?  Yes   Do you need help to go to places out of walking distance? No   Do you need help shopping? No   Do you need help preparing meals?  No   Do you need help with housework?  No   Do you need help with laundry? No   Do you need help taking your medications? No   Do you need help managing money? No   Do you ever drive or ride in a car without wearing a seat belt? No   Have you felt unusual stress, anger or loneliness in the last month? No   Who do you live with? Spouse   If you need help, do you have trouble finding someone available to you? No   Do you have difficulty concentrating, remembering or making decisions? No       Does the patient have evidence of cognitive impairment? no    No opioid medication identified on active medication list. I have reviewed chart for other  "potential  high risk medication/s and harmful drug interactions in the elderly.          Does not need ASA (and currently is not on it)    Vitals:    12/22/23 1308   BP: 140/82   Pulse: 78   Temp: 98 °F (36.7 °C)   TempSrc: Infrared   SpO2: 100%   Weight: 98 kg (216 lb)   Height: 193 cm (76\")       Body mass index is 26.29 kg/m².    Visual Acuity:  No results found.    Advanced Care Planning:  ACP discussion was held with the patient during this visit. Patient has an advance directive (not in EMR), copy requested.    Compared to one year ago, the patient feels physical health is the same.  Compared to one year ago, the patient feels mental health is the same.    Reviewed chart for potential of high risk medication in the elderly: yes  Reviewed chart for potential of harmful drug interactions in the elderly:yes    Identification of Risk Factors:  Risk factors include: Advance Directive Discussion  Alcohol Misuse  Cardiovascular risk  Colon Cancer Screening  Diabetic Lab Screening   Glaucoma Risk  Hearing Problem  Immunizations Discussed/Encouraged (specific immunizations; Tdap, Hepatitis A Vaccine/Series, Influenza, Pneumococcal 23, Shingrix, COVID19, and RSV (Respiratory Syncytial Virus) )  Prostate Cancer Screening .    Patient Self-Management and Personalized Health Advice  The patient has been provided with information about: diet, exercise, alcohol use, and fall prevention and preventive services including:   Annual Wellness Visit (AWV)  Colorectal Cancer Screening, Colonoscopy  Influenza Vaccine and Administration  Prostate Cancer Screening .  Follow Up: Medicare visit in one year    Discussed the patient's BMI with him. The BMI is above average; BMI management plan is completed.    Patient has multiple medical problems which are significant and separately identifiable that require additional work above and beyond the Medicare Wellness Visit. These are not trivial or insignificant and are billed with a " 25-modifier    Chief Complaint   Patient presents with    Medicare Wellness-subsequent     Review of medical issues    Annual Exam       HPI:  Kadeem Reyes is a 73 y.o. male RTC in yearly CPE/ AWV, review of medical issues: Has been doing well. Health has been 'pretty good'.   1. Seizure Disorder - occurred after fell down the steps 16 years ago with TBI and coma x 5 weeks.  On meds for > decade, now off with neurology and no return of seizures.  No recurrence over year.    2. TBI - after fall down steps ~17 years ago while intoxicated. In ICU with trach and PEG at time.  Has made near full recovery with only noted mild L sided weakness long term. ETOH reduction continues to be advised.   --> Slow gait - very notable on exam, present for > 5 years with no tremor or s/sx of Parkinson's.  Recalls 'really got worse' after had low back issue with epidurals 'about 5 yearas ago' and never did PT to recover.  Wants to do pool walking to help recover.   3. Microscopic colitis - dx'd in 2016 after C-scope with Dr. Richmond.  Saw GI over year and reviewed coffee intake, noting diarrhea sx resolved off coffee. Uses budesonide only !q 3 weeks.  Offered C-scope, not scheduled yet due to insurance issues.   4. DANIEL - on CPAP nightly. Wonders about Inspire. Uses CPAP nightly for now.  5. BPH - minimal sx. Still see urology ~10/2023.  No new issues.   6. HM - need to get Flu and COVID vaccine.  Not ahd Tdap but willing to consider. Wonders about RSV.     Review of Systems   Constitutional: Negative for chills, fever, weight gain and weight loss.   HENT:  Positive for hearing loss (R ear after fall in past, decline in word discrimination over time like in restaurant.). Negative for congestion, odynophagia and sore throat.    Eyes:  Negative for discharge, double vision, pain and redness.        Saw optho ~10/2023, 'need new glasses'.      Cardiovascular:  Negative for chest pain, dyspnea on exertion, irregular heartbeat, leg  swelling, near-syncope, palpitations and syncope.   Respiratory:  Negative for cough, shortness of breath and sleep disturbances due to breathing (on CPAP).    Endocrine: Negative for polydipsia, polyphagia and polyuria.   Hematologic/Lymphatic: Negative for bleeding problem. Does not bruise/bleed easily.   Skin:  Negative for rash and suspicious lesions.   Musculoskeletal:  Negative for back pain, joint pain, joint swelling, muscle cramps, muscle weakness and myalgias.   Gastrointestinal:  Negative for constipation, diarrhea, dysphagia, heartburn, nausea and vomiting.   Genitourinary:  Negative for bladder incontinence, dysuria, frequency, hematuria, hesitancy and incomplete emptying.   Neurological:  Positive for loss of balance (long term, since lumbar issue ~5 years ago; distant hx of TBI after fall). Negative for dizziness, headaches and light-headedness.   Psychiatric/Behavioral:  Negative for depression. The patient does not have insomnia and is not nervous/anxious.    Allergic/Immunologic: Negative for environmental allergies and persistent infections.       @OBJECTIVE BEGIN@  Vitals:    12/22/23 1308   BP: 140/82   Pulse: 78   Temp: 98 °F (36.7 °C)   SpO2: 100%     Physical Exam  Vitals reviewed.   Constitutional:       General: He is not in acute distress.     Appearance: Normal appearance. He is well-developed. He is not ill-appearing or toxic-appearing.   HENT:      Head: Normocephalic and atraumatic.      Right Ear: Tympanic membrane, ear canal and external ear normal. There is no impacted cerumen.      Left Ear: Tympanic membrane, ear canal and external ear normal. There is no impacted cerumen.      Nose: Nose normal.      Mouth/Throat:      Mouth: Mucous membranes are moist. No oral lesions.      Tongue: No lesions.      Pharynx: Oropharynx is clear. Uvula midline. No pharyngeal swelling, oropharyngeal exudate, posterior oropharyngeal erythema or uvula swelling.   Eyes:      General: Lids are normal.  No scleral icterus.        Right eye: No discharge.         Left eye: No discharge.      Extraocular Movements: Extraocular movements intact.      Conjunctiva/sclera: Conjunctivae normal.      Pupils: Pupils are equal, round, and reactive to light.   Neck:      Thyroid: No thyroid mass or thyromegaly.      Vascular: No carotid bruit.      Trachea: No tracheostomy (site fully healed).   Cardiovascular:      Rate and Rhythm: Normal rate and regular rhythm.      Pulses:           Radial pulses are 2+ on the right side and 2+ on the left side.        Dorsalis pedis pulses are 2+ on the right side and 2+ on the left side.        Posterior tibial pulses are 2+ on the right side and 2+ on the left side.      Heart sounds: Normal heart sounds, S1 normal and S2 normal. No murmur heard.     No friction rub. No gallop.   Pulmonary:      Effort: Pulmonary effort is normal. No respiratory distress.      Breath sounds: Normal breath sounds. No wheezing, rhonchi or rales.   Abdominal:      General: Bowel sounds are normal. There is no distension.      Palpations: Abdomen is soft. There is no mass.      Tenderness: There is no abdominal tenderness. There is no guarding or rebound.   Genitourinary:     Prostate: Normal. Not enlarged and not tender.      Rectum: Normal. No external hemorrhoid. Normal anal tone.   Musculoskeletal:         General: Normal range of motion.      Right shoulder: No tenderness, bony tenderness or crepitus. Normal range of motion.      Left shoulder: No tenderness, bony tenderness or crepitus. Normal range of motion.      Right hand: No tenderness or bony tenderness. Normal range of motion. Normal strength.      Left hand: No tenderness or bony tenderness. Normal range of motion. Normal strength.      Cervical back: Full passive range of motion without pain, normal range of motion and neck supple. Deformity (kyphotic posture) present. No muscular tenderness.      Right lower leg: No edema.      Left lower  leg: No edema.      Left foot: Bunion present.   Lymphadenopathy:      Cervical: No cervical adenopathy.      Right cervical: No superficial, deep or posterior cervical adenopathy.     Left cervical: No superficial, deep or posterior cervical adenopathy.      Upper Body:      Right upper body: No supraclavicular, axillary or pectoral adenopathy.      Left upper body: No supraclavicular, axillary or pectoral adenopathy.   Skin:     General: Skin is warm and dry.      Findings: No rash.          Neurological:      Mental Status: He is alert and oriented to person, place, and time.      Cranial Nerves: No cranial nerve deficit, dysarthria or facial asymmetry.      Sensory: No sensory deficit.      Motor: No weakness, tremor, atrophy or abnormal muscle tone.      Gait: Gait abnormal (shuffling gait, uses cane).      Deep Tendon Reflexes: Reflexes are normal and symmetric.      Reflex Scores:       Patellar reflexes are 2+ on the right side and 2+ on the left side.       Achilles reflexes are 2+ on the right side and 2+ on the left side.  Psychiatric:         Attention and Perception: Attention normal.         Mood and Affect: Mood normal.         Speech: Speech normal.         Behavior: Behavior normal. Behavior is cooperative.         Thought Content: Thought content normal.         Assessment & Plan   Diagnoses and all orders for this visit:    1. Encounter for subsequent annual wellness visit (AWV) in Medicare patient (Primary)    2. Encounter for general adult medical examination with abnormal findings    3. Seizure disorder    4. Rosacea    5. Prostate asymmetry    6. Other male erectile dysfunction    7. Primary osteoarthritis of both shoulders    8. DANIEL on CPAP    9. Microscopic colitis, unspecified microscopic colitis type    10. Needs flu shot  -     Fluzone High-Dose 65+yrs    11. Need for COVID-19 vaccine  -     COVID-19 F23 (Pfizer) 12yrs+ (COMIRNATY)    12. Age-related cataract of both eyes, unspecified  age-related cataract type    13. Carpal tunnel syndrome on both sides    14. Gait instability    15. Hyponatremia    16. Venous insufficiency of both lower extremities    17. Mixed conductive and sensorineural hearing loss of right ear with restricted hearing of left ear    18. Macrocytosis without anemia        Diagnoses and all orders for this visit:    Encounter for subsequent annual wellness visit (AWV) in Medicare patient    Encounter for general adult medical examination with abnormal findings    Seizure disorder    Rosacea    Prostate asymmetry    Other male erectile dysfunction    Primary osteoarthritis of both shoulders    DANIEL on CPAP    Microscopic colitis, unspecified microscopic colitis type    Needs flu shot  -     Fluzone High-Dose 65+yrs    Need for COVID-19 vaccine  -     COVID-19 F23 (Pfizer) 12yrs+ (COMIRNATY)    Age-related cataract of both eyes, unspecified age-related cataract type    Carpal tunnel syndrome on both sides    Gait instability    Hyponatremia    Venous insufficiency of both lower extremities    Mixed conductive and sensorineural hearing loss of right ear with restricted hearing of left ear    Macrocytosis without anemia    Other orders  -     Zinc Sulfate (ZINC 15 PO); Take  by mouth.      Kadeem Reyes is a 73 y.o. male RTC in yearly CPE/ AWV, review of medical issues:   1. CTS B - s/p hand surgery eval with bracing and CT injx. Resolved, LUCIA.  2. Varicose veins - new dx in 2019, s/p eval with vascular surgery still using long term compression on L only.   3. Seizure Disorder - occurred after fell down the steps ~2003 with TBI and coma x 5 weeks.  On meds for > decade, now off with neurology and no return of seizures.   Neuro f/u PRN at this time after neuro retired/ no sx.   4. TBI - after fall down steps ~2003 years ago while intoxicated. In ICU with trach and PEG at time.  Has made near full recovery with only noted mild L sided weakness long term. ETOH reduction continues to be  advised, especially with noted progressive MCV over last 2 years.   --> Slow gait - very notable on exam, present for > 5 years with no tremor or s/sx of Parkinson's.  Pt attributes to low back issues requiring epidural at time. Responded to PT forstrength and gait, but inconsistent home mgmnt. Pt considering pool walking to regain some mobility, encouraged.   5. Microscopic colitis - dx'd in 2016 after C-scope with Dr. Richmond.  Budesonide PRN only, rare use.  Sx improved with lowered caffeine intake. C-scope due, pt plans to schedule in 2024.   6. DANIEL - on CPAP nightly. Plans f/u to discuss Inspire device.   7. Rosacea - LUCIA  8. Hyponatremia - chronic since TBI, per pt.  Improved on labs. Monitor.    9. BPH - minimal sx. UTD urology ~10/2023.    10. Macrocytosis - noted progressive in last 2 years, distant hx with normal B12 2016.  Suspect ETOH and d/w pt need for reduction.  Trend B12/ folate/ retic count next labs.   11. HM - labs d/w pt; Flu / COVID update - today; Pvax/ Prevnar/ COVID/ Shingrix - UTD; Tdap at pharmacy; RSV vacccine reviewed with pt, d/w pt rationale and risk vs. benefit, reviewed trial data including GB syndrome and Afib incidence, pt defers; C-scope 3/2012 (-) --> pt plans in 2024; MITCH/ PSA per urology;  Hep C Ab (-) 12/2016; Preventative care plan provided to pt at end of visit    Return in about 1 year (around 12/22/2024) for Medicare Wellness. (F labs prior, include b12/ folate/ retic count, do NOT include PSA as is per urology)          Current Outpatient Medications:     Calcium Citrate (CITRACAL PO), Take 2 tablets by mouth Daily., Disp: , Rfl:     Cholecalciferol (VITAMIN D) 1000 UNITS tablet, Take  by mouth., Disp: , Rfl:     glucosamine sulfate 500 MG capsule capsule, Take  by mouth 3 (Three) Times a Day With Meals., Disp: , Rfl:     vitamin C (ASCORBIC ACID) 500 MG tablet, Take 1 tablet by mouth Daily., Disp: , Rfl:     vitamin E 400 UNIT capsule, Take 1 capsule by mouth Daily.,  Disp: , Rfl:     Budesonide (ENTOCORT EC) 3 MG 24 hr capsule, TAKE 1 CAPSULE BY MOUTH EVERY DAY AS NEEDED, Disp: 90 capsule, Rfl: 1    Zinc Sulfate (ZINC 15 PO), Take  by mouth., Disp: , Rfl:

## 2024-04-18 DIAGNOSIS — G47.33 OSA ON CPAP: Primary | ICD-10-CM

## 2024-04-22 ENCOUNTER — TELEPHONE (OUTPATIENT)
Dept: SLEEP MEDICINE | Facility: HOSPITAL | Age: 74
End: 2024-04-22
Payer: MEDICARE

## 2024-04-22 NOTE — TELEPHONE ENCOUNTER
Resupply order faxed to [employer].  Called [facility] to have respiratory therapist reach out to patient in regards to issues with his machine.  Patient will call if they do not hear from "[employer] in the next 24 hours.

## 2024-06-20 ENCOUNTER — OFFICE VISIT (OUTPATIENT)
Dept: SLEEP MEDICINE | Facility: HOSPITAL | Age: 74
End: 2024-06-20
Payer: MEDICARE

## 2024-06-20 VITALS
HEART RATE: 82 BPM | BODY MASS INDEX: 26.33 KG/M2 | OXYGEN SATURATION: 98 % | HEIGHT: 76 IN | WEIGHT: 216.2 LBS | SYSTOLIC BLOOD PRESSURE: 132 MMHG | DIASTOLIC BLOOD PRESSURE: 67 MMHG

## 2024-06-20 DIAGNOSIS — G47.33 OSA ON CPAP: Primary | ICD-10-CM

## 2024-06-20 PROCEDURE — 1159F MED LIST DOCD IN RCRD: CPT | Performed by: INTERNAL MEDICINE

## 2024-06-20 PROCEDURE — 1160F RVW MEDS BY RX/DR IN RCRD: CPT | Performed by: INTERNAL MEDICINE

## 2024-06-20 PROCEDURE — G0463 HOSPITAL OUTPT CLINIC VISIT: HCPCS

## 2024-06-20 PROCEDURE — 99213 OFFICE O/P EST LOW 20 MIN: CPT | Performed by: INTERNAL MEDICINE

## 2024-06-20 NOTE — PROGRESS NOTES
"  [facility]  [address]  [address]  [place] [address]  Phone   Fax       SLEEP CLINIC FOLLOW UP PROGRESS NOTE.    Kadeem Reyes  [id number]   1950  73 y.o.  male      PCP: Kadeem Squires MD      Date of visit: 6/20/2024    Chief Complaint   Patient presents with    Sleep Apnea       HPI:  This is a 73 y.o. years old patient is here for the management of obstructive sleep apnea.  Sleep apnea is severe in severity with a AHI of 49/hr. Patient is using positive airway pressure therapy with auto CPAP and the symptoms of sleep apnea have improved significantly on the therapy. Normally patient goes to bed at 9 PM and wakes up at 6 AM .  The patient wakes up 2 time(s) during the night and has no problem going back to sleep.  Feels refreshed after waking up.      Medications and allergies are reviewed by me and documented in the encounter.     SOCIAL (habits pertaining to sleep medicine)  History tobacco use:No   History of alcohol use: 10 per week  Caffeine use: 1     REVIEW OF SYSTEMS:   Pertaining positive symptoms are:  [place] Sleepiness Scale :Total score: 4   Hip pain        PHYSICAL EXAMINATION:  CONSTITUTIONAL:  Vitals:    06/20/24 0902   BP: 132/67   Pulse: 82   SpO2: 98%   Weight: 98.1 kg (216 lb 3.2 oz)   Height: 193 cm (76\")    Body mass index is 26.32 kg/m².   NOSE: nasal passages are clear, No deformities noted   RESP SYSTEM: Not in any respiratory distress, no chest deformities noted,   CARDIOVASULAR: No edema noted  NEURO: Oriented x 3, gait normal,  Mood and affect appeared appropriate      Data reviewed:  The Smart card downloaded on 6/20/2024 has been reviewed independently by me for compliance and discussed the data with the patient.   Compliance; 100%  More than 4 hr use, 100%  Average use of the device 7 hours and 56 minutes per night  Residual AHI: 5 /hr (goal < 5.0 /hr)  Mask type: Nasal pillows  Device: ResMed  DME: [facility]      ASSESSMENT AND " PLAN:  Obstructive sleep apnea ( G 47.33).  The symptoms of sleep apnea have improved with the device and the treatment.  Patient's compliance with the device is excellent for treatment of sleep apnea.  I have independently reviewed the smart card down load and discussed with the patient the download data and encouarged the patient to continue to use the device.The residual AHI is acceptable. The device is benefiting the patient and the device is medically necessary.  Without proper control of sleep apnea and good compliance there is a increased risk for hypertension, diabetes mellitus and nonrestorative sleep with hypersomnia which can increase risk for motor vehicle accidents.  Untreated sleep apnea is also a risk factor for development of atrial fibrillation, pulmonary hypertension, insulin resistance and stroke. The patient is also instructed to get the supplies from the DME company and and change them on a regular basis.  A prescription for supplies has been sent to the DME company.  I have also discussed the good sleep hygiene habits and adequate amount of sleep needed for good health.  Return in about 1 year (around 6/20/2025) for with smart card down load. . Patient's questions were answered.    6/20/2024  Juan Carlos Erazo MD  Sleep Medicine.  Medical Director,   [facility] sleep centers.

## 2024-11-05 NOTE — TELEPHONE ENCOUNTER
Caller:  MARK ADRIAN    Relationship: WIFE    Best call back number: [phone]     Requested Prescriptions:   Requested Prescriptions     Pending Prescriptions Disp Refills    Budesonide (ENTOCORT EC) 3 MG 24 hr capsule 90 capsule 1     Sig: TAKE 1 CAPSULE BY MOUTH EVERY DAY AS NEEDED        Pharmacy where request should be sent:  [facility][id number] [address] AT [facility] [phone] [facility] [phone]      Last office visit with prescribing clinician: 11/28/2023   Last telemedicine visit with prescribing clinician: Visit date not found   Next office visit with prescribing clinician: Visit date not found     Additional details provided by patient: COMPLETELY OUT OF MEDICATION    Does the patient have less than a 3 day supply:  [x] Yes  [] No    Would you like a call back once the refill request has been completed: [] Yes [x] No    If the office needs to give you a call back, can they leave a voicemail: [] Yes [x] No    Iris June Rep   11/05/24 13:35 EST

## 2024-11-06 RX ORDER — BUDESONIDE 3 MG/1
CAPSULE, COATED PELLETS ORAL
Qty: 90 CAPSULE | Refills: 0 | Status: SHIPPED | OUTPATIENT
Start: 2024-11-06

## 2024-12-03 ENCOUNTER — OFFICE VISIT (OUTPATIENT)
Dept: GASTROENTEROLOGY | Facility: CLINIC | Age: 74
End: 2024-12-03
Payer: MEDICARE

## 2024-12-03 VITALS
HEART RATE: 68 BPM | BODY MASS INDEX: 26.45 KG/M2 | DIASTOLIC BLOOD PRESSURE: 81 MMHG | SYSTOLIC BLOOD PRESSURE: 156 MMHG | WEIGHT: 217.2 LBS | TEMPERATURE: 97.9 F | HEIGHT: 76 IN | OXYGEN SATURATION: 96 %

## 2024-12-03 DIAGNOSIS — K52.839 MICROSCOPIC COLITIS, UNSPECIFIED MICROSCOPIC COLITIS TYPE: Primary | ICD-10-CM

## 2024-12-03 PROCEDURE — 1160F RVW MEDS BY RX/DR IN RCRD: CPT | Performed by: INTERNAL MEDICINE

## 2024-12-03 PROCEDURE — 99213 OFFICE O/P EST LOW 20 MIN: CPT | Performed by: INTERNAL MEDICINE

## 2024-12-03 PROCEDURE — 1159F MED LIST DOCD IN RCRD: CPT | Performed by: INTERNAL MEDICINE

## 2024-12-03 RX ORDER — BUDESONIDE 3 MG/1
CAPSULE, COATED PELLETS ORAL
Qty: 90 CAPSULE | Refills: 3 | Status: SHIPPED | OUTPATIENT
Start: 2024-12-03

## 2024-12-03 NOTE — PROGRESS NOTES
Chief Complaint   Patient presents with    Microscopic colitis        Kadeem Reyes is a  74 y.o. male here for a follow up visit for history of microscopic colitis    HPI this 74-year-old male patient of Dr. Kadeem Squires presents since last seen in the office November 2023.  He has history of bowel pattern changes dates back to 2012 when colonoscopy revealed findings consistent with microscopic colitis.  He was treated with budesonide which she takes intermittently for control of this.  He had 1 episode recently of bowel pattern changes after eating chili.  We had offered colonoscopic examination last year as he is due for screening evaluation as well as given this history and he declined.  I have offered it again and he is hesitant.  He will inform us of his decision in the near future.  We will continue to use budesonide as needed and see him in 1 years time or sooner as conditions warrant.    Past Medical History:   Diagnosis Date    Benign prostatic hyperplasia without lower urinary tract symptoms 10/28/2019    Carpal tunnel syndrome on both sides 10/28/2019    Profound presentation with pain,  weakness, and arm pain to shoulder in 2019; improved with injx and braces. S/P hand surgery eval with Dr. Sauceda.     Gait instability     History of gout     Hyponatremia     Intracranial hemorrhage     after fall down steps in ~2003, coma x ~5 weeks    Macrocytosis without anemia 9/8/2016    B12 normal in 2016; normalized on 2019 labs.      Microscopic colitis     DANIEL on CPAP     AHI 49/h    Osteoarthritis     hands    Other male erectile dysfunction 10/28/2019    Peripheral neuropathy     Seizure disorder     Traumatic brain injury     after fall in 2003       Current Outpatient Medications   Medication Sig Dispense Refill    Budesonide (ENTOCORT EC) 3 MG 24 hr capsule TAKE 1 CAPSULE BY MOUTH EVERY DAY AS NEEDED 90 capsule 3    Calcium Citrate (CITRACAL PO) Take 2 tablets by mouth Daily.      Cholecalciferol  "(VITAMIN D) 1000 UNITS tablet Take  by mouth.      glucosamine sulfate 500 MG capsule capsule Take  by mouth 3 (Three) Times a Day With Meals.      vitamin C (ASCORBIC ACID) 500 MG tablet Take 1 tablet by mouth Daily.      vitamin E 400 UNIT capsule Take 1 capsule by mouth Daily.      Zinc Sulfate (ZINC 15 PO) Take  by mouth.       No current facility-administered medications for this visit.       PRN Meds:.    Allergies   Allergen Reactions    Phenytoin        Social History     Socioeconomic History    Marital status:     Number of children: 2   Tobacco Use    Smoking status: Never    Smokeless tobacco: Never   Vaping Use    Vaping status: Never Used   Substance and Sexual Activity    Alcohol use: Yes     Alcohol/week: 5.0 standard drinks of alcohol     Types: 5 Glasses of wine per week     Comment: daily 2-3 beers/ daily, \"sometimes nothing' about 2x/ week; 2 akin/ 5 nights per week in 2090-2579    Drug use: No    Sexual activity: Yes     Partners: Female     Birth control/protection: Post-menopausal     Comment: wife only; hx of gonorrhea in youth       Family History   Problem Relation Age of Onset    Kidney cancer Mother     Diabetes Father     Diabetes Sister     Diabetes Sister     Stroke Sister     Diabetes Sister     Leukemia Sister     Scoliosis Brother     No Known Problems Brother     No Known Problems Daughter     No Known Problems Son        Review of Systems   Constitutional:  Negative for activity change, appetite change, fatigue and unexpected weight change.   HENT:  Negative for congestion, facial swelling, sore throat, trouble swallowing and voice change.    Eyes:  Negative for photophobia and visual disturbance.   Respiratory:  Negative for cough and choking.    Cardiovascular:  Negative for chest pain.   Gastrointestinal:  Negative for abdominal distention, abdominal pain, anal bleeding, blood in stool, constipation, diarrhea, nausea, rectal pain and vomiting.   Endocrine: Negative for " polyphagia.   Musculoskeletal:  Negative for arthralgias, gait problem and joint swelling.   Skin:  Negative for color change, pallor and rash.   Allergic/Immunologic: Negative for food allergies.   Neurological:  Negative for speech difficulty and headaches.   Hematological:  Does not bruise/bleed easily.   Psychiatric/Behavioral:  Negative for agitation, confusion and sleep disturbance.        Vitals:    12/03/24 1010   BP: 156/81   Pulse: 68   Temp: 97.9 °F (36.6 °C)   SpO2: 96%       Physical Exam  Constitutional:       Appearance: He is well-developed.   HENT:      Head: Normocephalic.   Eyes:      Conjunctiva/sclera: Conjunctivae normal.   Cardiovascular:      Rate and Rhythm: Normal rate and regular rhythm.   Pulmonary:      Breath sounds: Normal breath sounds.   Abdominal:      General: Bowel sounds are normal.      Palpations: Abdomen is soft.   Musculoskeletal:         General: Normal range of motion.      Cervical back: Normal range of motion.   Skin:     General: Skin is warm and dry.   Neurological:      Mental Status: He is alert and oriented to person, place, and time.   Psychiatric:         Behavior: Behavior normal.         ASSESSMENT   #1 microscopic colitis  #2 screening for colorectal cancer      PLAN  Continue budesonide as needed  Offered colonoscopic examination, patient is to consider and contact us with his decision  Office follow-up annually      ICD-10-CM ICD-9-CM   1. Microscopic colitis, unspecified microscopic colitis type  K52.839 558.9

## 2024-12-18 DIAGNOSIS — N52.8 OTHER MALE ERECTILE DYSFUNCTION: ICD-10-CM

## 2024-12-18 DIAGNOSIS — D75.89 MACROCYTOSIS WITHOUT ANEMIA: ICD-10-CM

## 2024-12-18 DIAGNOSIS — E87.1 HYPONATREMIA: ICD-10-CM

## 2024-12-18 DIAGNOSIS — N42.89 PROSTATE ASYMMETRY: ICD-10-CM

## 2024-12-18 DIAGNOSIS — E78.5 HYPERLIPIDEMIA, UNSPECIFIED HYPERLIPIDEMIA TYPE: ICD-10-CM

## 2024-12-18 DIAGNOSIS — Z00.00 ENCOUNTER FOR SUBSEQUENT ANNUAL WELLNESS VISIT (AWV) IN MEDICARE PATIENT: Primary | ICD-10-CM

## 2024-12-18 DIAGNOSIS — E78.5 ELEVATED LIPIDS: ICD-10-CM

## 2024-12-21 LAB
ABN OPTION 3: NORMAL
ALBUMIN SERPL-MCNC: 4.1 G/DL (ref 3.5–5.2)
ALBUMIN/GLOB SERPL: 1.5 G/DL
ALP SERPL-CCNC: 90 U/L (ref 39–117)
ALT SERPL-CCNC: 11 U/L (ref 1–41)
APPEARANCE UR: CLEAR
AST SERPL-CCNC: 14 U/L (ref 1–40)
BACTERIA #/AREA URNS HPF: NORMAL /[HPF]
BASOPHILS # BLD AUTO: 0.02 10*3/MM3 (ref 0–0.2)
BASOPHILS NFR BLD AUTO: 0.4 % (ref 0–1.5)
BILIRUB SERPL-MCNC: 0.8 MG/DL (ref 0–1.2)
BILIRUB UR QL STRIP: NEGATIVE
BUN SERPL-MCNC: 11 MG/DL (ref 8–23)
BUN/CREAT SERPL: 11.5 (ref 7–25)
CALCIUM SERPL-MCNC: 9 MG/DL (ref 8.6–10.5)
CASTS URNS QL MICRO: NORMAL /LPF
CHLORIDE SERPL-SCNC: 97 MMOL/L (ref 98–107)
CHOLEST SERPL-MCNC: 181 MG/DL (ref 0–200)
CHOLEST/HDLC SERPL: 2.23 {RATIO}
CO2 SERPL-SCNC: 27.2 MMOL/L (ref 22–29)
COLOR UR: YELLOW
CREAT SERPL-MCNC: 0.96 MG/DL (ref 0.76–1.27)
EGFRCR SERPLBLD CKD-EPI 2021: 83.5 ML/MIN/1.73
EOSINOPHIL # BLD AUTO: 0.08 10*3/MM3 (ref 0–0.4)
EOSINOPHIL NFR BLD AUTO: 1.7 % (ref 0.3–6.2)
EPI CELLS #/AREA URNS HPF: NORMAL /HPF (ref 0–10)
ERYTHROCYTE [DISTWIDTH] IN BLOOD BY AUTOMATED COUNT: 12.3 % (ref 12.3–15.4)
FOLATE SERPL-MCNC: 9.05 NG/ML (ref 4.78–24.2)
GLOBULIN SER CALC-MCNC: 2.7 GM/DL
GLUCOSE SERPL-MCNC: 94 MG/DL (ref 65–99)
GLUCOSE UR QL STRIP: NEGATIVE
HCT VFR BLD AUTO: 40.5 % (ref 37.5–51)
HDLC SERPL-MCNC: 81 MG/DL (ref 40–60)
HGB BLD-MCNC: 14.7 G/DL (ref 13–17.7)
HGB UR QL STRIP: ABNORMAL
IMM GRANULOCYTES # BLD AUTO: 0.02 10*3/MM3 (ref 0–0.05)
IMM GRANULOCYTES NFR BLD AUTO: 0.4 % (ref 0–0.5)
KETONES UR QL STRIP: NEGATIVE
LDLC SERPL CALC-MCNC: 86 MG/DL (ref 0–100)
LEUKOCYTE ESTERASE UR QL STRIP: NEGATIVE
LYMPHOCYTES # BLD AUTO: 1.21 10*3/MM3 (ref 0.7–3.1)
LYMPHOCYTES NFR BLD AUTO: 26.1 % (ref 19.6–45.3)
MCH RBC QN AUTO: 36.4 PG (ref 26.6–33)
MCHC RBC AUTO-ENTMCNC: 36.3 G/DL (ref 31.5–35.7)
MCV RBC AUTO: 100.2 FL (ref 79–97)
MICRO URNS: ABNORMAL
MONOCYTES # BLD AUTO: 0.56 10*3/MM3 (ref 0.1–0.9)
MONOCYTES NFR BLD AUTO: 12.1 % (ref 5–12)
NEUTROPHILS # BLD AUTO: 2.74 10*3/MM3 (ref 1.7–7)
NEUTROPHILS NFR BLD AUTO: 59.3 % (ref 42.7–76)
NITRITE UR QL STRIP: NEGATIVE
NRBC BLD AUTO-RTO: 0 /100 WBC (ref 0–0.2)
PH UR STRIP: 7 [PH] (ref 5–7.5)
PLATELET # BLD AUTO: 163 10*3/MM3 (ref 140–450)
POTASSIUM SERPL-SCNC: 4.4 MMOL/L (ref 3.5–5.2)
PROT SERPL-MCNC: 6.8 G/DL (ref 6–8.5)
PROT UR QL STRIP: NEGATIVE
RBC # BLD AUTO: 4.04 10*6/MM3 (ref 4.14–5.8)
RBC #/AREA URNS HPF: NORMAL /HPF (ref 0–2)
RETICS/RBC NFR AUTO: 2.01 % (ref 0.7–1.9)
SODIUM SERPL-SCNC: 133 MMOL/L (ref 136–145)
SP GR UR STRIP: 1.01 (ref 1–1.03)
TRIGL SERPL-MCNC: 74 MG/DL (ref 0–150)
TSH SERPL DL<=0.005 MIU/L-ACNC: 3.54 UIU/ML (ref 0.27–4.2)
URINALYSIS REFLEX: ABNORMAL
UROBILINOGEN UR STRIP-MCNC: 0.2 MG/DL (ref 0.2–1)
VIT B12 SERPL-MCNC: 334 PG/ML (ref 211–946)
VLDLC SERPL CALC-MCNC: 14 MG/DL (ref 5–40)
WBC # BLD AUTO: 4.63 10*3/MM3 (ref 3.4–10.8)
WBC #/AREA URNS HPF: NORMAL /HPF (ref 0–5)

## 2024-12-26 ENCOUNTER — OFFICE VISIT (OUTPATIENT)
Dept: INTERNAL MEDICINE | Facility: CLINIC | Age: 74
End: 2024-12-26
Payer: MEDICARE

## 2024-12-26 VITALS
OXYGEN SATURATION: 99 % | HEART RATE: 83 BPM | WEIGHT: 220 LBS | BODY MASS INDEX: 26.79 KG/M2 | TEMPERATURE: 98.1 F | HEIGHT: 76 IN | DIASTOLIC BLOOD PRESSURE: 56 MMHG | SYSTOLIC BLOOD PRESSURE: 118 MMHG

## 2024-12-26 DIAGNOSIS — H90.A31 MIXED CONDUCTIVE AND SENSORINEURAL HEARING LOSS OF RIGHT EAR WITH RESTRICTED HEARING OF LEFT EAR: ICD-10-CM

## 2024-12-26 DIAGNOSIS — E87.1 HYPONATREMIA: ICD-10-CM

## 2024-12-26 DIAGNOSIS — J20.9 ACUTE BRONCHITIS, UNSPECIFIED ORGANISM: ICD-10-CM

## 2024-12-26 DIAGNOSIS — I87.2 VENOUS INSUFFICIENCY OF BOTH LOWER EXTREMITIES: ICD-10-CM

## 2024-12-26 DIAGNOSIS — L71.9 ROSACEA: ICD-10-CM

## 2024-12-26 DIAGNOSIS — Z23 NEEDS FLU SHOT: ICD-10-CM

## 2024-12-26 DIAGNOSIS — Z00.01 ENCOUNTER FOR GENERAL ADULT MEDICAL EXAMINATION WITH ABNORMAL FINDINGS: Primary | ICD-10-CM

## 2024-12-26 DIAGNOSIS — G40.909 SEIZURE DISORDER: ICD-10-CM

## 2024-12-26 DIAGNOSIS — D75.89 MACROCYTOSIS WITHOUT ANEMIA: ICD-10-CM

## 2024-12-26 DIAGNOSIS — K52.839 MICROSCOPIC COLITIS, UNSPECIFIED MICROSCOPIC COLITIS TYPE: ICD-10-CM

## 2024-12-26 DIAGNOSIS — R05.9 COUGH, UNSPECIFIED TYPE: ICD-10-CM

## 2024-12-26 DIAGNOSIS — R26.81 GAIT INSTABILITY: ICD-10-CM

## 2024-12-26 DIAGNOSIS — Z00.00 ENCOUNTER FOR SUBSEQUENT ANNUAL WELLNESS VISIT (AWV) IN MEDICARE PATIENT: ICD-10-CM

## 2024-12-26 DIAGNOSIS — N52.8 OTHER MALE ERECTILE DYSFUNCTION: ICD-10-CM

## 2024-12-26 DIAGNOSIS — G47.33 OSA ON CPAP: ICD-10-CM

## 2024-12-26 PROBLEM — G56.03 CARPAL TUNNEL SYNDROME ON BOTH SIDES: Status: RESOLVED | Noted: 2019-10-28 | Resolved: 2024-12-26

## 2024-12-26 LAB
EXPIRATION DATE: NORMAL
FLUAV AG UPPER RESP QL IA.RAPID: NOT DETECTED
FLUBV AG UPPER RESP QL IA.RAPID: NOT DETECTED
INTERNAL CONTROL: NORMAL
Lab: NORMAL
SARS-COV-2 AG UPPER RESP QL IA.RAPID: NOT DETECTED

## 2024-12-26 PROCEDURE — 1170F FXNL STATUS ASSESSED: CPT | Performed by: INTERNAL MEDICINE

## 2024-12-26 PROCEDURE — G0439 PPPS, SUBSEQ VISIT: HCPCS | Performed by: INTERNAL MEDICINE

## 2024-12-26 PROCEDURE — G0008 ADMIN INFLUENZA VIRUS VAC: HCPCS | Performed by: INTERNAL MEDICINE

## 2024-12-26 PROCEDURE — 87428 SARSCOV & INF VIR A&B AG IA: CPT | Performed by: INTERNAL MEDICINE

## 2024-12-26 PROCEDURE — 1160F RVW MEDS BY RX/DR IN RCRD: CPT | Performed by: INTERNAL MEDICINE

## 2024-12-26 PROCEDURE — 99397 PER PM REEVAL EST PAT 65+ YR: CPT | Performed by: INTERNAL MEDICINE

## 2024-12-26 PROCEDURE — 90662 IIV NO PRSV INCREASED AG IM: CPT | Performed by: INTERNAL MEDICINE

## 2024-12-26 PROCEDURE — 1126F AMNT PAIN NOTED NONE PRSNT: CPT | Performed by: INTERNAL MEDICINE

## 2024-12-26 PROCEDURE — 1159F MED LIST DOCD IN RCRD: CPT | Performed by: INTERNAL MEDICINE

## 2024-12-26 PROCEDURE — 99213 OFFICE O/P EST LOW 20 MIN: CPT | Performed by: INTERNAL MEDICINE

## 2024-12-26 RX ORDER — ALBUTEROL SULFATE 90 UG/1
2 INHALANT RESPIRATORY (INHALATION) EVERY 6 HOURS PRN
Qty: 6.7 G | Refills: 1 | Status: SHIPPED | OUTPATIENT
Start: 2024-12-26

## 2024-12-26 RX ORDER — BENZONATATE 100 MG/1
100 CAPSULE ORAL 3 TIMES DAILY PRN
Qty: 30 CAPSULE | Refills: 1 | Status: SHIPPED | OUTPATIENT
Start: 2024-12-26

## 2024-12-26 RX ORDER — GUAIFENESIN AND DEXTROMETHORPHAN HYDROBROMIDE 1200; 60 MG/1; MG/1
TABLET, EXTENDED RELEASE ORAL
Start: 2024-12-26

## 2024-12-26 RX ORDER — LANOLIN ALCOHOL/MO/W.PET/CERES
1000 CREAM (GRAM) TOPICAL DAILY
COMMUNITY

## 2024-12-26 NOTE — PROGRESS NOTES
Subjective   Kadeem Reyes is a 74 y.o. male who presents for a Medicare Well Visit    Patient Care Team:  Kadeem Squires MD as PCP - General (Internal Medicine)    Recent Hospitalizations: No recent hospitalization(s).    Depression Screen:       2024     1:05 PM   PHQ-2/PHQ-9 Depression Screening   Little interest or pleasure in doing things Not at all   Feeling down, depressed, or hopeless Not at all       Functional and Cognitive Screenin/26/2024     1:04 PM   Functional & Cognitive Status   Do you have difficulty preparing food and eating? No   Do you have difficulty bathing yourself, getting dressed or grooming yourself? No   Do you have difficulty using the toilet? No   Do you have difficulty moving around from place to place? Yes   Do you have trouble with steps or getting out of a bed or a chair? Yes   Current Diet Well Balanced Diet   Dental Exam Up to date   Eye Exam Up to date   Exercise (times per week) 0 times per week   Current Exercises Include No Regular Exercise   Do you need help using the phone?  No   Are you deaf or do you have serious difficulty hearing?  Yes   Do you need help to go to places out of walking distance? Yes   Do you need help shopping? Yes   Do you need help preparing meals?  No   Do you need help with housework?  Yes   Do you need help with laundry? Yes   Do you need help taking your medications? No   Do you need help managing money? No   Do you ever drive or ride in a car without wearing a seat belt? No   Have you felt unusual stress, anger or loneliness in the last month? No   Who do you live with? Spouse   If you need help, do you have trouble finding someone available to you? No   Have you been bothered in the last four weeks by sexual problems? No   Do you have difficulty concentrating, remembering or making decisions? No       Does the patient have evidence of cognitive impairment? no    No opioid medication identified on active medication list. I have  "reviewed chart for other potential  high risk medication/s and harmful drug interactions in the elderly.          Does not need ASA (and currently is not on it)    Vitals:    12/26/24 1310   BP: 118/56   BP Location: Left arm   Patient Position: Sitting   Cuff Size: Adult   Pulse: 83   Temp: 98.1 °F (36.7 °C)   TempSrc: Oral   SpO2: 99%   Weight: 99.8 kg (220 lb)   Height: 193 cm (75.98\")   PainSc: 0-No pain       Body mass index is 26.79 kg/m².    Visual Acuity:  No results found.    Advanced Care Planning:  ACP discussion was held with the patient during this visit. Patient has an advance directive (not in EMR), copy requested.    Compared to one year ago, the patient feels physical health is the same.  Compared to one year ago, the patient feels mental health is the same.    Reviewed chart for potential of high risk medication in the elderly: yes  Reviewed chart for potential of harmful drug interactions in the elderly:yes    Identification of Risk Factors:  Risk factors include: Alcohol Misuse  Cardiovascular risk  Colon Cancer Screening  Diabetic Lab Screening   Glaucoma Risk  Hearing Problem  Immunizations Discussed/Encouraged (specific immunizations; Tdap, Hepatitis A Vaccine/Series, Influenza, Pneumococcal 23, Shingrix, COVID19, and RSV (Respiratory Syncytial Virus) ).    Patient Self-Management and Personalized Health Advice  The patient has been provided with information about: diet, exercise, weight management, and alcohol use and preventive services including:   Annual Wellness Visit (AWV)  Colorectal Cancer Screening, Colonoscopy  Prostate Cancer Screening .  Follow Up: Medicare visit in one year    Discussed the patient's BMI with him. The BMI is above average; BMI management plan is completed.    Patient has multiple medical problems which are significant and separately identifiable that require additional work above and beyond the Medicare Wellness Visit. These are not trivial or insignificant and " "are billed with a 25-modifier    Chief Complaint   Patient presents with    Medicare Wellness-subsequent     Review of medical issues.    Cough     Productive       HPI:  Kadeem Reyes is a 74 y.o. male RTC in yearly AWV, review of medical issues:   Started with cough ~36 hours ago, productive. No fever. Kept up last night coughing.  No wheezing. No SOA. No other URI sx.  No sick contacts at home.   Meds: None OTC tried other than wife's old Rx for cough syrup. Used wife's neb tx and 'helped me out a lot'.  Feels like 'relieved the tightness'.        1. CTS B - s/p hand surgery eval with bracing and CT injx. Resolved, no recurrence.  2. Seizure Disorder - occurred after fell down the steps ~2003 with TBI and coma x 5 weeks.  On meds for > decade, now off with neurology and no return of seizures. NO recurrence.   3. TBI, after fall down steps ~2003 years ago while intoxicated. In ICU with trach and PEG at time.  Has made near full recovery with only noted mild L sided weakness long term - feels like 'going pretty good. My walking is less and less good. I am too lazy to go out and do it (walking/ exercise)'.  Did do aqua therapy once, but did not go back, 'lazy, I guess'.   4. Microscopic colitis - dx'd in 2016 after C-scope with Dr. Richmond.  \"it comes and goes\".  \"Does not come very often'.  Used Budesonide this Am after sx with aggressive diet yesterday.  Sx, when occur, stomach with kind of rumble and 'some diarrhea'.  No blood in stool.   5. DANIEL - on CPAP nightly. Inquired about hypoglossal nerve stimulator and deferred as 'lot of surgery and cost about 40 grand'. \"My CPAP machine works fine\".   Plans f/u to discuss Inspire device.   6. BPH - minimal sx, no change over year. Urine flow 'good'. Sees urology in ~2 weeks.   7. HM -needs to get flu and COVID update, willing to complete; did not get Tdap at pharmacy over the year and has not had RSV vaccine        Review of Systems   Constitutional: Negative for " chills, fever, malaise/fatigue and weight loss.   HENT:  Positive for hearing loss. Negative for odynophagia and sore throat.    Eyes:  Negative for discharge, double vision, pain and redness.        Saw optho ~3/2024, wears glasses     Cardiovascular:  Negative for chest pain, dyspnea on exertion, irregular heartbeat, leg swelling, near-syncope, palpitations and syncope.   Respiratory:  Positive for cough and sputum production. Negative for shortness of breath and wheezing.    Endocrine: Negative for polydipsia, polyphagia and polyuria.   Hematologic/Lymphatic: Negative for bleeding problem. Does not bruise/bleed easily.   Skin:  Negative for rash and suspicious lesions.   Musculoskeletal:  Negative for joint pain, joint swelling, muscle cramps, muscle weakness and myalgias.   Gastrointestinal:  Positive for diarrhea (in last 24 hours, variable with MC). Negative for dysphagia, heartburn, nausea and vomiting.   Genitourinary:  Negative for dysuria, frequency, hematuria, hesitancy and incomplete emptying.   Neurological:  Negative for dizziness, headaches and light-headedness.   Psychiatric/Behavioral:  Negative for depression. The patient does not have insomnia and is not nervous/anxious.    Allergic/Immunologic: Negative for environmental allergies and persistent infections.     @OBJECTIVE BEGIN@  Vitals:    12/26/24 1310   BP: 118/56   Pulse: 83   Temp: 98.1 °F (36.7 °C)   SpO2: 99%     Physical Exam  Vitals reviewed.   Constitutional:       General: He is not in acute distress.     Appearance: Normal appearance. He is well-developed. He is not ill-appearing or toxic-appearing.   HENT:      Head: Normocephalic and atraumatic.      Right Ear: Tympanic membrane, ear canal and external ear normal. There is no impacted cerumen.      Left Ear: Tympanic membrane, ear canal and external ear normal. There is no impacted cerumen.      Nose: Nose normal.      Mouth/Throat:      Mouth: Mucous membranes are moist. No oral  lesions.      Tongue: No lesions.      Pharynx: Oropharynx is clear. Uvula midline. No pharyngeal swelling, oropharyngeal exudate, posterior oropharyngeal erythema or uvula swelling.   Eyes:      General: Lids are normal. No scleral icterus.        Right eye: No discharge.         Left eye: No discharge.      Extraocular Movements: Extraocular movements intact.      Conjunctiva/sclera: Conjunctivae normal.      Pupils: Pupils are equal, round, and reactive to light.   Neck:      Thyroid: No thyroid mass or thyromegaly.      Vascular: No carotid bruit.   Cardiovascular:      Rate and Rhythm: Normal rate and regular rhythm.      Pulses:           Radial pulses are 2+ on the right side and 2+ on the left side.        Dorsalis pedis pulses are 2+ on the right side and 2+ on the left side.        Posterior tibial pulses are 2+ on the right side and 2+ on the left side.      Heart sounds: Normal heart sounds, S1 normal and S2 normal. No murmur heard.     No friction rub. No gallop.      Comments: Compression socks on BLE today  Pulmonary:      Effort: Pulmonary effort is normal. No tachypnea, accessory muscle usage or respiratory distress.      Breath sounds: Normal breath sounds. No decreased breath sounds, wheezing, rhonchi or rales.      Comments: No cough noted during visit  Chest:      Chest wall: No tenderness. There is no dullness to percussion.      Comments: No egophony bilateral  Abdominal:      General: Bowel sounds are normal. There is no distension.      Palpations: Abdomen is soft. There is no mass.      Tenderness: There is no abdominal tenderness. There is no guarding or rebound.   Musculoskeletal:         General: No deformity. Normal range of motion.      Right shoulder: No tenderness, bony tenderness or crepitus. Normal range of motion.      Left shoulder: No tenderness, bony tenderness or crepitus. Normal range of motion.      Right hand: No tenderness or bony tenderness. Normal range of motion. Normal  strength.      Left hand: No tenderness or bony tenderness. Normal range of motion. Normal strength.      Cervical back: Full passive range of motion without pain, normal range of motion and neck supple.      Right lower leg: No edema.      Left lower leg: No edema.   Lymphadenopathy:      Cervical: No cervical adenopathy.      Right cervical: No superficial, deep or posterior cervical adenopathy.     Left cervical: No superficial, deep or posterior cervical adenopathy.      Upper Body:      Right upper body: No supraclavicular, axillary or pectoral adenopathy.      Left upper body: No supraclavicular, axillary or pectoral adenopathy.   Skin:     General: Skin is warm and dry.      Findings: No rash.   Neurological:      Mental Status: He is alert and oriented to person, place, and time.      Cranial Nerves: No cranial nerve deficit, dysarthria or facial asymmetry.      Sensory: No sensory deficit.      Motor: No weakness, tremor, atrophy or abnormal muscle tone.      Gait: Gait abnormal (Baseline limp, uses cane).      Deep Tendon Reflexes: Reflexes are normal and symmetric.      Reflex Scores:       Patellar reflexes are 2+ on the right side and 2+ on the left side.       Achilles reflexes are 2+ on the right side and 2+ on the left side.  Psychiatric:         Attention and Perception: Attention normal.         Mood and Affect: Mood normal.         Speech: Speech normal.         Behavior: Behavior normal. Behavior is cooperative.         Thought Content: Thought content normal.         Results for orders placed or performed in visit on 12/26/24   POCT SARS-CoV-2 + Flu Antigen ANDRÉS    Collection Time: 12/26/24  1:27 PM    Specimen: Swab   Result Value Ref Range    SARS Antigen Not Detected Not Detected, Presumptive Negative    Influenza A Antigen ANDRÉS Not Detected Not Detected    Influenza B Antigen ANDRÉS Not Detected Not Detected    Internal Control Passed Passed    Lot Number [id number]     Expiration Date 10/11/2025           Assessment & Plan   Diagnoses and all orders for this visit:    1. Encounter for general adult medical examination with abnormal findings (Primary)    2. Encounter for subsequent annual wellness visit (AWV) in Medicare patient    3. DANIEL on CPAP    4. Acute bronchitis, unspecified organism  -     POCT SARS-CoV-2 + Flu Antigen ANDRÉS    5. Cough, unspecified type  -     POCT SARS-CoV-2 + Flu Antigen ANDRÉS    6. Needs flu shot  -     Fluzone High-Dose 65+yrs    7. Gait instability    8. Hyponatremia    9. Macrocytosis without anemia    10. Microscopic colitis, unspecified microscopic colitis type    11. Mixed conductive and sensorineural hearing loss of right ear with restricted hearing of left ear    12. Other male erectile dysfunction    13. Rosacea    14. Venous insufficiency of both lower extremities    15. Seizure disorder          Diagnoses and all orders for this visit:    Encounter for general adult medical examination with abnormal findings    Encounter for subsequent annual wellness visit (AWV) in Medicare patient    DANIEL on CPAP    Acute bronchitis, unspecified organism  -     POCT SARS-CoV-2 + Flu Antigen ANDRÉS    Cough, unspecified type  -     POCT SARS-CoV-2 + Flu Antigen ANDRÉS    Needs flu shot  -     Fluzone High-Dose 65+yrs    Gait instability    Hyponatremia    Macrocytosis without anemia    Microscopic colitis, unspecified microscopic colitis type    Mixed conductive and sensorineural hearing loss of right ear with restricted hearing of left ear    Other male erectile dysfunction    Rosacea    Venous insufficiency of both lower extremities    Seizure disorder    Other orders  -     vitamin B-12 (CYANOCOBALAMIN) 1000 MCG tablet; Take 1 tablet by mouth Daily.        Kadeem Reyes is a 74 y.o. male RTC in yearly AWV, review of medical issues:   1.  Acute cough/bronchitis - new issue in last 36 hours, without fever/SOA/wheezing.  Exam nonfocal today.  Patient did find relief using wife's nebulizer treatment  "at home (however patient has no known RAD or COPD Hx).  Suspect viral acute bronchitis.  Add Mucinex DM OTC BID.  Rx benzonatate PRN cough.  Will Rx albuterol inhaler to usePRN for coughing fits, if patient finds helpful.  Patient declined CXR in office today.  Flu/COVID (-) in office.  Pt to call or RTC if T> 100.5, SOA, double sickness, or failure to improve.     2. Hx of CTS B - s/p hand surgery eval with bracing and CT injx. Resolved, No recurrence.  3. Varicose veins/venous insufficiency, L >> R - new dx in 2019, s/p eval with vascular surgery with compliance on long term compression.   4. TBI, after fall down steps ~2003 years ago while intoxicated. In ICU with trach and PEG at time.  Near full recovery with only noted mild L sided weakness long term/ gait alteration, present for > 5 years with no tremor or s/sx of Parkinson's  - overall doing well, stable.  Strongly encouraged patient to start exercise as suspect dealing with some age-related general muscle stamina/strength decline/heightened fall risk at baseline.  --> Seizure Disorder,  occurred after fell down the steps ~2003 with TBI and coma x 5 weeks.  On meds for > decade, now off with neurology -no recurrence.  Neuro f/u PRN at this time after neuro retired/ no sx.   --> Hyponatremia - chronic since TBI, per pt.  Stable on labs (> 130). Monitor.    5. Microscopic colitis, dx'd in 2016 after C-scope with Dr. Richmond - Budesonide PRN only, rare use.  UTD GI 12/2024.  Sx improved with lowered caffeine intake over time. C-scope due, pt plans to schedule in \"Spring\".   6. DANIEL - on CPAP nightly. Inquired about hypoglossal nerve stimulator, deferred option.   7. Rosacea - LUCIA  8. BPH - minimal sx. UA clear. Sees urology in ~2 weeks for annual.  9. Macrocytosis - noted progressive in last 3 years, distant hx with normal B12 2016.  B12 low normal on recheck today, equivocal reticulocyte count with no anemia.  Still suspect ETOH and d/w pt need for reduction. "  Add B12 1000 mcg OTC daily.  10. HM - labs d/w pt; Flu - today; Pvax/ Prevnar/ COVID primary/ Shingrix - UTD; COVID update/Tdap/RSV advised, reviewed, counseled and complete at pharmacy; C-scope 3/2012 (-) --> pt plans in Spring with GI (counseled, declines Cologuard option today); MITCH/ PSA per urology;  Hep C Ab (-) 12/2016; Preventative care plan provided to pt at end of visit      Return in about 1 year (around 12/26/2025) for Medicare Wellness.          Current Outpatient Medications:     Budesonide (ENTOCORT EC) 3 MG 24 hr capsule, TAKE 1 CAPSULE BY MOUTH EVERY DAY AS NEEDED, Disp: 90 capsule, Rfl: 3    Calcium Citrate (CITRACAL PO), Take 2 tablets by mouth Daily., Disp: , Rfl:     Cholecalciferol (VITAMIN D) 1000 UNITS tablet, Take  by mouth., Disp: , Rfl:     glucosamine sulfate 500 MG capsule capsule, Take  by mouth 3 (Three) Times a Day With Meals., Disp: , Rfl:     vitamin C (ASCORBIC ACID) 500 MG tablet, Take 1 tablet by mouth Daily., Disp: , Rfl:     vitamin B-12 (CYANOCOBALAMIN) 1000 MCG tablet, Take 1 tablet by mouth Daily., Disp: , Rfl:     Zinc Sulfate (ZINC 15 PO), Take  by mouth. (Patient not taking: Reported on 12/26/2024), Disp: , Rfl:

## 2024-12-26 NOTE — PATIENT INSTRUCTIONS
Tdap/COVID update/RSV advised, complete at pharmacy      Medicare Wellness  Personal Prevention Plan of Service     Date of Office Visit:    Encounter Provider:  Kadeem Squires MD  Place of Service:  [facility]  Patient Name: Kadeem Reyes  :  1950    As part of the Medicare Wellness portion of your visit today, we are providing you with this personalized preventive plan of services (PPPS). This plan is based upon recommendations of the [place] Preventive Services Task Force (USPSTF) and the Advisory Committee on Immunization Practices (ACIP).    This lists the preventive care services that should be considered, and provides dates of when you are due. Items listed as completed are up-to-date and do not require any further intervention.    Health Maintenance   Topic Date Due    TDAP/TD VACCINES (2 - Td or Tdap) 2021    COLORECTAL CANCER SCREENING  2022    COVID-19 Vaccine (5 - - season) 2024    ANNUAL WELLNESS VISIT  2025    BMI FOLLOWUP  2025    HEPATITIS C SCREENING  Completed    INFLUENZA VACCINE  Completed    Pneumococcal Vaccine 65+  Completed    ZOSTER VACCINE  Completed       Orders Placed This Encounter   Procedures    Fluzone High-Dose 65+yrs    POCT SARS-CoV-2 + Flu Antigen ANDRÉS     Order Specific Question:   Release to patient     Answer:   Routine Release [[id number]]       Return in about 1 year (around 2025) for Medicare Wellness.

## 2024-12-26 NOTE — LETTER
[facility]  Vaccine Consent Form    Patient Name:  Kadeem Reyes  Patient :  1950     Vaccine(s) Ordered    Fluzone High-Dose 65+yrs        Screening Checklist  The following questions should be completed prior to vaccination. If you answer “yes” to any question, it does not necessarily mean you should not be vaccinated. It just means we may need to clarify or ask more questions. If a question is unclear, please ask your healthcare provider to explain it.    Yes No   Any fever or moderate to severe illness today (mild illness and/or antibiotic treatment are not contraindications)?     Do you have a history of a serious reaction to any previous vaccinations, such as anaphylaxis, encephalopathy within 7 days, Guillain-Welcome syndrome within 6 weeks, seizure?     Have you received any live vaccine(s) (e.g MMR, PONCHO) or any other vaccines in the last month (to ensure duplicate doses aren't given)?     Do you have an anaphylactic allergy to latex (DTaP, DTaP-IPV, Hep A, Hep B, MenB, RV, Td, Tdap), baker’s yeast (Hep B, HPV), polysorbates (RSV, nirsevimab, PCV 20, Rotavirrus, Tdap, Shingrix), or gelatin (PONCHO, MMR)?     Do you have an anaphylactic allergy to neomycin (Rabies, PONCHO, MMR, IPV, Hep A), polymyxin B (IPV), or streptomycin (IPV)?      Any cancer, leukemia, AIDS, or other immune system disorder? (PONCHO, MMR, RV)     Do you have a parent, brother, or sister with an immune system problem (if immune competence of vaccine recipient clinically verified, can proceed)? (MMR, PONCHO)     Any recent steroid treatments for >2 weeks, chemotherapy, or radiation treatment? (PONCHO, MMR)     Have you received antibody-containing blood transfusions or IVIG in the past 11 months (recommended interval is dependent on product)? (MMR, PONCHO)     Have you taken antiviral drugs (acyclovir, famciclovir, valacyclovir for PONCHO) in the last 24 or 48 hours, respectively?      Are you pregnant or planning to become pregnant within 1 month?  "(PONCHO, MMR, HPV, IPV, MenB, Abrexvy; For Hep B- refer to Engerix-B; For RSV - Abrysvo is indicated for 32-36 weeks of pregnancy from September to January)     For infants, have you ever been told your child has had intussusception or a medical emergency involving obstruction of the intestine (Rotavirus)? If not for an infant, can skip this question.         *Ordering Physicians/APC should be consulted if \"yes\" is checked by the patient or guardian above.  I have received, read, and understand the Vaccine Information Statement (VIS) for each vaccine ordered.  I have considered my or my child's health status as well as the health status of my close contacts.  I have taken the opportunity to discuss my vaccine questions with my or my child's health care provider.   I have requested that the ordered vaccine(s) be given to me or my child.  I understand the benefits and risks of the vaccines.  I understand that I should remain in the clinic for 15 minutes after receiving the vaccine(s).  _________________________________________________________  Signature of Patient or Parent/Legal Guardian ____________________  Date     "

## 2025-06-19 ENCOUNTER — OFFICE VISIT (OUTPATIENT)
Dept: SLEEP MEDICINE | Facility: HOSPITAL | Age: 75
End: 2025-06-19
Payer: MEDICARE

## 2025-06-19 VITALS
HEART RATE: 71 BPM | SYSTOLIC BLOOD PRESSURE: 159 MMHG | OXYGEN SATURATION: 94 % | DIASTOLIC BLOOD PRESSURE: 68 MMHG | BODY MASS INDEX: 27.03 KG/M2 | HEIGHT: 76 IN | WEIGHT: 222 LBS

## 2025-06-19 DIAGNOSIS — G47.33 OSA ON CPAP: Primary | ICD-10-CM

## 2025-06-19 PROCEDURE — G0463 HOSPITAL OUTPT CLINIC VISIT: HCPCS

## 2025-06-19 PROCEDURE — 1159F MED LIST DOCD IN RCRD: CPT | Performed by: INTERNAL MEDICINE

## 2025-06-19 PROCEDURE — 1160F RVW MEDS BY RX/DR IN RCRD: CPT | Performed by: INTERNAL MEDICINE

## 2025-06-19 PROCEDURE — 99213 OFFICE O/P EST LOW 20 MIN: CPT | Performed by: INTERNAL MEDICINE

## 2025-06-19 NOTE — PROGRESS NOTES
"  [facility]  Sleep medicine  [address]  [address]  [place] [address]  Phone   Fax       SLEEP CLINIC FOLLOW UP PROGRESS NOTE.    Kadeem Reyes  [id number]   1950  74 y.o.  male      PCP: Kadeem Squires MD      Date of visit: 6/19/2025    Chief Complaint   Patient presents with    Sleep Apnea       HPI:  This is a 74 y.o. years old patient is here for the management of obstructive sleep apnea.  Sleep apnea is severe in severity with a AHI of 49/hr. Patient is using positive airway pressure therapy with auto CPAP and the symptoms of sleep apnea have improved significantly on the therapy. Normally patient goes to bed at 9 PM and wakes up at 6 AM .  The patient wakes up 2 time(s) during the night and has no problem going back to sleep.  Feels refreshed after waking up.  Patient has gait instability uses a walker    Medications and allergies are reviewed by me and documented in the encounter.     SOCIAL (habits pertaining to sleep medicine)  History tobacco use:No   History of alcohol use: 10 per week  Caffeine use: 1     REVIEW OF SYSTEMS:   Pertaining positive symptoms are:  Bassett Sleepiness Scale :Total score: 2   Hip pain        PHYSICAL EXAMINATION:  CONSTITUTIONAL:  Vitals:    06/19/25 1013   BP: 159/68   Pulse: 71   SpO2: 94%   Weight: 101 kg (222 lb)   Height: 193 cm (75.98\")    Body mass index is 27.04 kg/m².   NOSE: nasal passages are clear, No deformities noted   RESP SYSTEM: Not in any respiratory distress, no chest deformities noted,   CARDIOVASULAR: No edema noted  NEURO: Oriented x 3, gait normal,  Mood and affect appeared appropriate      Data reviewed:  The Smart card downloaded on 6/19/2025 has been reviewed independently by me for compliance and discussed the data with the patient.   Compliance; 100%  More than 4 hr use, 100%  Average use of the device 8 hours per night  Residual AHI: 5 /hr (goal < 5.0 /hr)  Mask type: Nasal pillows  [facility] " [facility]  [facility]: [facility]      ASSESSMENT AND PLAN:  Obstructive sleep apnea ( G 47.33).  The symptoms of sleep apnea have improved with the device and the treatment.  Patient's compliance with the device is excellent for treatment of sleep apnea.  I have independently reviewed the smart card down load and discussed with the patient the download data and encouarged the patient to continue to use the device.The residual AHI is acceptable. The device is benefiting the patient and the device is medically necessary.  Without proper control of sleep apnea and good compliance there is a increased risk for hypertension, diabetes mellitus and nonrestorative sleep with hypersomnia which can increase risk for motor vehicle accidents.  Untreated sleep apnea is also a risk factor for development of atrial fibrillation, pulmonary hypertension, insulin resistance and stroke. The patient is also instructed to get the supplies from the DME company and and change them on a regular basis.  A prescription for supplies has been sent to the DME company.  I have also discussed the good sleep hygiene habits and adequate amount of sleep needed for good health.  Return in about 1 year (around 6/19/2026) for with smart card down load. . Patient's questions were answered.    6/19/2025  Juan Carlos Erazo MD  Sleep Medicine.  Medical Director,   [facility], [facility] sleep centers.

## 2025-07-02 ENCOUNTER — TELEPHONE (OUTPATIENT)
Dept: SLEEP MEDICINE | Facility: HOSPITAL | Age: 75
End: 2025-07-02
Payer: MEDICARE